# Patient Record
Sex: FEMALE | Race: BLACK OR AFRICAN AMERICAN | NOT HISPANIC OR LATINO | Employment: OTHER | URBAN - METROPOLITAN AREA
[De-identification: names, ages, dates, MRNs, and addresses within clinical notes are randomized per-mention and may not be internally consistent; named-entity substitution may affect disease eponyms.]

---

## 2023-03-31 RX ORDER — SPIRONOLACTONE 25 MG/1
12.5 TABLET ORAL DAILY
COMMUNITY
End: 2023-04-20 | Stop reason: SDUPTHER

## 2023-04-20 DIAGNOSIS — I10 HYPERTENSION, UNSPECIFIED TYPE: Primary | ICD-10-CM

## 2023-04-20 RX ORDER — LOSARTAN POTASSIUM 25 MG/1
1 TABLET ORAL DAILY
COMMUNITY
Start: 2022-08-09 | End: 2023-04-20 | Stop reason: SDUPTHER

## 2023-04-21 RX ORDER — LOSARTAN POTASSIUM 25 MG/1
25 TABLET ORAL DAILY
Qty: 90 TABLET | Refills: 3 | Status: SHIPPED | OUTPATIENT
Start: 2023-04-21 | End: 2024-06-05 | Stop reason: SDUPTHER

## 2023-04-21 RX ORDER — SPIRONOLACTONE 25 MG/1
12.5 TABLET ORAL DAILY
Qty: 90 TABLET | Refills: 3 | Status: SHIPPED | OUTPATIENT
Start: 2023-04-21 | End: 2024-05-23 | Stop reason: SDUPTHER

## 2023-05-01 ENCOUNTER — OFFICE VISIT (OUTPATIENT)
Dept: PRIMARY CARE | Facility: CLINIC | Age: 88
End: 2023-05-01
Payer: MEDICARE

## 2023-05-01 VITALS — WEIGHT: 150 LBS | DIASTOLIC BLOOD PRESSURE: 72 MMHG | SYSTOLIC BLOOD PRESSURE: 122 MMHG | BODY MASS INDEX: 28.34 KG/M2

## 2023-05-01 DIAGNOSIS — R26.2 DIFFICULTY WALKING: ICD-10-CM

## 2023-05-01 DIAGNOSIS — E78.5 HYPERLIPIDEMIA, UNSPECIFIED HYPERLIPIDEMIA TYPE: ICD-10-CM

## 2023-05-01 DIAGNOSIS — D64.9 ANEMIA, UNSPECIFIED TYPE: ICD-10-CM

## 2023-05-01 DIAGNOSIS — I10 HYPERTENSION, UNSPECIFIED TYPE: Primary | ICD-10-CM

## 2023-05-01 DIAGNOSIS — M19.90 ARTHRITIS: ICD-10-CM

## 2023-05-01 PROCEDURE — 3078F DIAST BP <80 MM HG: CPT | Performed by: INTERNAL MEDICINE

## 2023-05-01 PROCEDURE — 99214 OFFICE O/P EST MOD 30 MIN: CPT | Performed by: INTERNAL MEDICINE

## 2023-05-01 PROCEDURE — G0439 PPPS, SUBSEQ VISIT: HCPCS | Performed by: INTERNAL MEDICINE

## 2023-05-01 PROCEDURE — 1159F MED LIST DOCD IN RCRD: CPT | Performed by: INTERNAL MEDICINE

## 2023-05-01 PROCEDURE — 1036F TOBACCO NON-USER: CPT | Performed by: INTERNAL MEDICINE

## 2023-05-01 PROCEDURE — 1170F FXNL STATUS ASSESSED: CPT | Performed by: INTERNAL MEDICINE

## 2023-05-01 PROCEDURE — 3074F SYST BP LT 130 MM HG: CPT | Performed by: INTERNAL MEDICINE

## 2023-05-01 PROCEDURE — 1160F RVW MEDS BY RX/DR IN RCRD: CPT | Performed by: INTERNAL MEDICINE

## 2023-05-01 RX ORDER — FUROSEMIDE 20 MG/1
TABLET ORAL
COMMUNITY
End: 2023-11-16 | Stop reason: SDUPTHER

## 2023-05-01 RX ORDER — AMLODIPINE BESYLATE 5 MG/1
1 TABLET ORAL DAILY
COMMUNITY
End: 2023-08-01 | Stop reason: DRUGHIGH

## 2023-05-01 RX ORDER — MULTIVITAMIN
1 TABLET ORAL DAILY
COMMUNITY

## 2023-05-01 RX ORDER — HYDROXYCHLOROQUINE SULFATE 200 MG/1
1 TABLET, FILM COATED ORAL DAILY
COMMUNITY
Start: 2014-11-10 | End: 2023-08-01 | Stop reason: SDUPTHER

## 2023-05-01 RX ORDER — MIRTAZAPINE 7.5 MG/1
7.5 TABLET, FILM COATED ORAL NIGHTLY
COMMUNITY
End: 2023-10-27 | Stop reason: SDUPTHER

## 2023-05-01 RX ORDER — METOLAZONE 5 MG/1
5 TABLET ORAL
COMMUNITY
Start: 2013-04-16 | End: 2023-08-01

## 2023-05-01 RX ORDER — CELECOXIB 200 MG/1
200 CAPSULE ORAL 2 TIMES DAILY
COMMUNITY
Start: 2012-06-23 | End: 2023-08-01

## 2023-05-01 RX ORDER — FERROUS SULFATE 325(65) MG
325 TABLET ORAL
COMMUNITY
End: 2023-08-01

## 2023-05-01 RX ORDER — CYANOCOBALAMIN 1000 UG/ML
1000 INJECTION, SOLUTION INTRAMUSCULAR; SUBCUTANEOUS
COMMUNITY
Start: 2016-07-11

## 2023-05-01 RX ORDER — LEVOTHYROXINE SODIUM 25 UG/1
TABLET ORAL
COMMUNITY
End: 2023-08-28 | Stop reason: SDUPTHER

## 2023-05-01 RX ORDER — METOLAZONE 5 MG/1
1 TABLET ORAL DAILY
COMMUNITY
End: 2023-08-01

## 2023-05-01 RX ORDER — POTASSIUM CHLORIDE 1500 MG/1
1 TABLET, EXTENDED RELEASE ORAL 2 TIMES DAILY
COMMUNITY
Start: 2020-06-04 | End: 2023-08-01 | Stop reason: SDUPTHER

## 2023-05-01 RX ORDER — PREDNISONE 5 MG/1
TABLET ORAL
COMMUNITY
End: 2023-08-01 | Stop reason: SDUPTHER

## 2023-05-01 RX ORDER — PANTOPRAZOLE SODIUM 40 MG/1
1 TABLET, DELAYED RELEASE ORAL DAILY
COMMUNITY
End: 2023-11-03 | Stop reason: WASHOUT

## 2023-05-01 RX ORDER — MULTIVITAMIN WITH IRON
1 TABLET ORAL
COMMUNITY
Start: 2011-07-13

## 2023-05-01 RX ORDER — AMLODIPINE BESYLATE 5 MG/1
5 TABLET ORAL
COMMUNITY
Start: 2011-07-16 | End: 2023-08-01 | Stop reason: ALTCHOICE

## 2023-05-01 RX ORDER — HYDROCHLOROTHIAZIDE 25 MG/1
25 TABLET ORAL
COMMUNITY
Start: 2012-10-27 | End: 2023-08-01 | Stop reason: SDUPTHER

## 2023-05-01 RX ORDER — TRAMADOL HYDROCHLORIDE 50 MG/1
1 TABLET ORAL 2 TIMES DAILY PRN
COMMUNITY
Start: 2014-08-12 | End: 2023-08-01

## 2023-05-01 RX ORDER — TRIAMCINOLONE ACETONIDE 55 UG/1
2 SPRAY, METERED NASAL
COMMUNITY
Start: 2012-04-14 | End: 2023-08-01

## 2023-05-01 RX ORDER — POTASSIUM CHLORIDE 1500 MG/1
TABLET, EXTENDED RELEASE ORAL 2 TIMES DAILY
COMMUNITY
End: 2024-05-08 | Stop reason: SDUPTHER

## 2023-05-01 RX ORDER — HYDROCHLOROTHIAZIDE 25 MG/1
1 TABLET ORAL DAILY
COMMUNITY
End: 2023-08-01 | Stop reason: SDUPTHER

## 2023-05-01 ASSESSMENT — ACTIVITIES OF DAILY LIVING (ADL)
GROCERY_SHOPPING: NEEDS ASSISTANCE
MANAGING_FINANCES: NEEDS ASSISTANCE
BATHING: NEEDS ASSISTANCE
DOING_HOUSEWORK: NEEDS ASSISTANCE
TAKING_MEDICATION: NEEDS ASSISTANCE
DRESSING: NEEDS ASSISTANCE

## 2023-05-01 ASSESSMENT — PAIN SCALES - GENERAL: PAINLEVEL: 2

## 2023-05-01 ASSESSMENT — ENCOUNTER SYMPTOMS
OCCASIONAL FEELINGS OF UNSTEADINESS: 1
DEPRESSION: 0
LOSS OF SENSATION IN FEET: 0

## 2023-05-01 NOTE — PROGRESS NOTES
Subjective   Reason for Visit: Rita Maldonado is an 89 y.o. female here for a Medicare Wellness visit.     Past Medical, Surgical, and Family History reviewed and updated in chart.    Reviewed all medications by prescribing practitioner or clinical pharmacist (such as prescriptions, OTCs, herbal therapies and supplements) and documented in the medical record.    HPI  89 years old female comes in accompanied by her daughter for a subsequent Medicare wellness exam and follow-up visit.  She suffers from hypertension and takes losartan 25 mg a day spironolactone 25 mg half a tablet a day.  She complains of severe osteoarthritis in her hands and all over her joints.  She takes no medication for that.  Lives at home with her son who takes care of her.  No known allergies.  Does not smoke does not drink.  Lab was done recently and February 10, 2020 x-ray and we agreed to repeat her lab next visit in around July or August.  Patient Care Team:  Pedro Diallo MD as PCP - General (Internal Medicine)     Review of Systems  Severe osteoarthritis in her fingers and her hands and most of her joint.  Otherwise she is feeling well.  Cognitively she is awake and alert.  She remembers things she has good cognitive status.  Uses a walker for ambulation.  Had no falls recently.  He eats well and sleeps well.  Objective   Vitals:  /72 (BP Location: Left arm, Patient Position: Sitting)   Wt 68 kg (150 lb)   BMI 28.34 kg/m²       Physical Exam  Alert oriented in no distress.  Nonicteric sclera or jaundice.  Face symmetrical cranial nerves intact.  Lungs clear no rales wheezing or crackles but diminished.  Neck supple no masses no bruits no thyromegaly or jugular venous distention.  Heart normal S1 and S2 regular rhythm.  Abdomen benign nontender no masses no organomegaly.  Neurological exam intact 5 clicks.  I am sorry what  Assessment/Plan   Problem List Items Addressed This Visit    None  Visit Diagnoses       Hypertension,  unspecified type    -  Primary    Hyperlipidemia, unspecified hyperlipidemia type        Anemia, unspecified type        Arthritis        Relevant Orders    Disability Placard    Difficulty walking        Relevant Orders    Disability Placard

## 2023-08-01 ENCOUNTER — OFFICE VISIT (OUTPATIENT)
Dept: PRIMARY CARE | Facility: CLINIC | Age: 88
End: 2023-08-01
Payer: MEDICARE

## 2023-08-01 VITALS
WEIGHT: 153 LBS | TEMPERATURE: 97.1 F | SYSTOLIC BLOOD PRESSURE: 112 MMHG | DIASTOLIC BLOOD PRESSURE: 70 MMHG | BODY MASS INDEX: 28.91 KG/M2

## 2023-08-01 DIAGNOSIS — M06.9 RHEUMATOID ARTHRITIS INVOLVING MULTIPLE SITES, UNSPECIFIED WHETHER RHEUMATOID FACTOR PRESENT (MULTI): ICD-10-CM

## 2023-08-01 DIAGNOSIS — M15.9 PRIMARY OSTEOARTHRITIS INVOLVING MULTIPLE JOINTS: ICD-10-CM

## 2023-08-01 DIAGNOSIS — I10 HYPERTENSION, UNSPECIFIED TYPE: Primary | ICD-10-CM

## 2023-08-01 DIAGNOSIS — E78.5 HYPERLIPIDEMIA, UNSPECIFIED HYPERLIPIDEMIA TYPE: ICD-10-CM

## 2023-08-01 PROCEDURE — 3074F SYST BP LT 130 MM HG: CPT | Performed by: INTERNAL MEDICINE

## 2023-08-01 PROCEDURE — 1036F TOBACCO NON-USER: CPT | Performed by: INTERNAL MEDICINE

## 2023-08-01 PROCEDURE — 85025 COMPLETE CBC W/AUTO DIFF WBC: CPT | Performed by: INTERNAL MEDICINE

## 2023-08-01 PROCEDURE — 1159F MED LIST DOCD IN RCRD: CPT | Performed by: INTERNAL MEDICINE

## 2023-08-01 PROCEDURE — 99214 OFFICE O/P EST MOD 30 MIN: CPT | Performed by: INTERNAL MEDICINE

## 2023-08-01 PROCEDURE — 1160F RVW MEDS BY RX/DR IN RCRD: CPT | Performed by: INTERNAL MEDICINE

## 2023-08-01 PROCEDURE — 80061 LIPID PANEL: CPT | Performed by: INTERNAL MEDICINE

## 2023-08-01 PROCEDURE — 1125F AMNT PAIN NOTED PAIN PRSNT: CPT | Performed by: INTERNAL MEDICINE

## 2023-08-01 PROCEDURE — 80053 COMPREHEN METABOLIC PANEL: CPT | Performed by: INTERNAL MEDICINE

## 2023-08-01 PROCEDURE — 3078F DIAST BP <80 MM HG: CPT | Performed by: INTERNAL MEDICINE

## 2023-08-01 RX ORDER — PREDNISONE 5 MG/1
5 TABLET ORAL DAILY
Qty: 30 TABLET | Refills: 1 | Status: SHIPPED | OUTPATIENT
Start: 2023-08-01 | End: 2023-11-03 | Stop reason: WASHOUT

## 2023-08-01 RX ORDER — HYDROXYCHLOROQUINE SULFATE 200 MG/1
200 TABLET, FILM COATED ORAL DAILY
Qty: 90 TABLET | Refills: 1 | Status: SHIPPED | OUTPATIENT
Start: 2023-08-01 | End: 2023-11-16 | Stop reason: SDUPTHER

## 2023-08-01 ASSESSMENT — ENCOUNTER SYMPTOMS
DEPRESSION: 0
LOSS OF SENSATION IN FEET: 0
OCCASIONAL FEELINGS OF UNSTEADINESS: 1

## 2023-08-01 ASSESSMENT — PAIN SCALES - GENERAL: PAINLEVEL: 6

## 2023-08-01 NOTE — PROGRESS NOTES
Subjective   Patient ID: Rita Maldonado is a 89 y.o. female who presents for Hypertension, Hypothyroidism, Hyperlipidemia, and Osteoarthritis.    HPI   89 years old female comes in to see me in the wheelchair and pushed by her daughter.  She is living at home and her son is taking care of her and all of her nutrition.  She is feeling very well except for arthritic pain.  This is all over her body.  She has rheumatoid arthritis for which she takes hydroxychloroquine 200 mg once a day.  We agreed to add prednisone 5 mg a day.  No other symptoms.  Review of system is negative.  Review of Systems    Objective   /70 (BP Location: Left arm, Patient Position: Sitting, BP Cuff Size: Adult)   Temp 36.2 °C (97.1 °F) (Temporal)   Wt 69.4 kg (153 lb)   BMI 28.91 kg/m²     Physical Exam  Alert and oriented in no acute distress pleasant cooperative.  Nonicteric sclera or jaundice.  Face symmetrical cranial nerves intact.  Neck supple no masses no lymph no thyromegaly or jugular venous distention.  Lungs clear diminished but no rales or wheezing.  Heart normal S1 and S2 regular rhythm systolic ejection murmur 2/6 present.  Abdomen benign neurologically intact  To add prednisone 5 mg a day to take with your food on daily basis same time every day.  Let me know in 30 days how things go home.  I will see you in 3 months otherwise.  Assessment/Plan   Diagnoses and all orders for this visit:  Hypertension, unspecified type  -     Comprehensive Metabolic Panel  -     CBC  Hyperlipidemia, unspecified hyperlipidemia type  -     Lipid Panel  Primary osteoarthritis involving multiple joints  -     predniSONE (Deltasone) 5 mg tablet; Take 1 tablet (5 mg) by mouth once daily.  Rheumatoid arthritis involving multiple sites, unspecified whether rheumatoid factor present (CMS/[facility])  -     hydroxychloroquine (Plaquenil) 200 mg tablet; Take 1 tablet (200 mg) by mouth once daily.

## 2023-08-04 ENCOUNTER — TELEPHONE (OUTPATIENT)
Dept: PRIMARY CARE | Facility: CLINIC | Age: 88
End: 2023-08-04
Payer: MEDICARE

## 2023-08-04 NOTE — TELEPHONE ENCOUNTER
I called the patient with lab results and discussed them with her daughter.  Informed her about sugar 1083 the kidney function GFR is 31.51.  Liver function is good lipid panel is good.  Make sure she drinks enough water every day

## 2023-08-10 ENCOUNTER — TELEPHONE (OUTPATIENT)
Dept: PRIMARY CARE | Facility: CLINIC | Age: 88
End: 2023-08-10
Payer: MEDICARE

## 2023-08-10 DIAGNOSIS — G47.00 INSOMNIA, UNSPECIFIED TYPE: ICD-10-CM

## 2023-08-11 RX ORDER — MIRTAZAPINE 7.5 MG/1
7.5 TABLET, FILM COATED ORAL NIGHTLY
Qty: 90 TABLET | Refills: 0 | Status: SHIPPED | OUTPATIENT
Start: 2023-08-11 | End: 2023-11-03 | Stop reason: SDUPTHER

## 2023-08-28 DIAGNOSIS — R53.83 FATIGUE, UNSPECIFIED TYPE: Primary | ICD-10-CM

## 2023-08-28 RX ORDER — LEVOTHYROXINE SODIUM 25 UG/1
25 TABLET ORAL
Qty: 90 TABLET | Refills: 2 | Status: SHIPPED | OUTPATIENT
Start: 2023-08-28 | End: 2024-05-23 | Stop reason: SDUPTHER

## 2023-10-13 DIAGNOSIS — M15.9 PRIMARY OSTEOARTHRITIS INVOLVING MULTIPLE JOINTS: ICD-10-CM

## 2023-10-27 DIAGNOSIS — F32.A DEPRESSION, UNSPECIFIED DEPRESSION TYPE: Primary | ICD-10-CM

## 2023-10-27 RX ORDER — MIRTAZAPINE 7.5 MG/1
7.5 TABLET, FILM COATED ORAL NIGHTLY
Qty: 90 TABLET | Refills: 3 | Status: SHIPPED | OUTPATIENT
Start: 2023-10-27

## 2023-11-03 ENCOUNTER — OFFICE VISIT (OUTPATIENT)
Dept: PRIMARY CARE | Facility: CLINIC | Age: 88
End: 2023-11-03
Payer: MEDICARE

## 2023-11-03 VITALS
WEIGHT: 154 LBS | BODY MASS INDEX: 29.1 KG/M2 | DIASTOLIC BLOOD PRESSURE: 79 MMHG | SYSTOLIC BLOOD PRESSURE: 162 MMHG | TEMPERATURE: 97.3 F | HEART RATE: 85 BPM | OXYGEN SATURATION: 97 %

## 2023-11-03 DIAGNOSIS — M15.9 PRIMARY OSTEOARTHRITIS INVOLVING MULTIPLE JOINTS: ICD-10-CM

## 2023-11-03 DIAGNOSIS — E78.5 HYPERLIPIDEMIA, UNSPECIFIED HYPERLIPIDEMIA TYPE: ICD-10-CM

## 2023-11-03 DIAGNOSIS — I10 HYPERTENSION, UNSPECIFIED TYPE: ICD-10-CM

## 2023-11-03 DIAGNOSIS — Z23 FLU VACCINE NEED: Primary | ICD-10-CM

## 2023-11-03 DIAGNOSIS — G47.00 INSOMNIA, UNSPECIFIED TYPE: ICD-10-CM

## 2023-11-03 DIAGNOSIS — M06.9 RHEUMATOID ARTHRITIS INVOLVING MULTIPLE SITES, UNSPECIFIED WHETHER RHEUMATOID FACTOR PRESENT (MULTI): ICD-10-CM

## 2023-11-03 PROCEDURE — 1159F MED LIST DOCD IN RCRD: CPT | Performed by: INTERNAL MEDICINE

## 2023-11-03 PROCEDURE — 3078F DIAST BP <80 MM HG: CPT | Performed by: INTERNAL MEDICINE

## 2023-11-03 PROCEDURE — 99213 OFFICE O/P EST LOW 20 MIN: CPT | Performed by: INTERNAL MEDICINE

## 2023-11-03 PROCEDURE — 1160F RVW MEDS BY RX/DR IN RCRD: CPT | Performed by: INTERNAL MEDICINE

## 2023-11-03 PROCEDURE — 1036F TOBACCO NON-USER: CPT | Performed by: INTERNAL MEDICINE

## 2023-11-03 PROCEDURE — 1126F AMNT PAIN NOTED NONE PRSNT: CPT | Performed by: INTERNAL MEDICINE

## 2023-11-03 PROCEDURE — 3077F SYST BP >= 140 MM HG: CPT | Performed by: INTERNAL MEDICINE

## 2023-11-03 ASSESSMENT — PAIN SCALES - GENERAL: PAINLEVEL: 0-NO PAIN

## 2023-11-03 NOTE — PROGRESS NOTES
Subjective   Patient ID: Rita Maldonado is a 89 y.o. female who presents for Follow-up.    HPI   89 years old female comes in to see me accompanied by her daughter for regular checkup.  She needs a flu shot but unfortunately we ran out of the high-dose so she decided to go to the pharmacy to get it.  I offered her the flu shot high-dose by next week.  Otherwise she is feeling well and has no specific complaint or symptoms.  Her medications reviewed and reconciled.  She had exactly what I had on my list.  Review of Systems  12 system reviewed 12 system negative.  She denies any chest pain shortness of breath insomnia bad or poor appetite no GI no  or neurological symptoms.  Objective   /79 (BP Location: Left arm, Patient Position: Sitting, BP Cuff Size: Adult)   Pulse 85   Temp 36.3 °C (97.3 °F) (Temporal)   Wt 69.9 kg (154 lb)   SpO2 97%   BMI 29.10 kg/m²     Physical Exam  Alert oriented in no distress very pleasant cooperative sweet lady.  Nonicteric sclera no jaundice.  Face symmetrical cranial nerves intact.  Neck supple no masses no lymph node thyromegaly or jugular venous distention.  Lungs clear diminished but no rales no wheezing.  Heart normal S1 and S2 regular rhythm.  Abdomen benign nontender no masses no organomegaly.  No pain on palpations.  Neurological exam intact.  Assessment/Plan   Diagnoses and all orders for this visit:  Flu vaccine need  -     Flu vaccine, quadrivalent, high-dose, preservative free, age 65y+ (FLUZONE)  Insomnia, unspecified type  Hypertension, unspecified type  Hyperlipidemia, unspecified hyperlipidemia type  Primary osteoarthritis involving multiple joints  Rheumatoid arthritis involving multiple sites, unspecified whether rheumatoid factor present (CMS/[facility])

## 2023-11-09 RX ORDER — PREDNISONE 5 MG/1
5 TABLET ORAL DAILY
Qty: 30 TABLET | Refills: 1 | Status: SHIPPED | OUTPATIENT
Start: 2023-11-09 | End: 2024-02-02 | Stop reason: SDUPTHER

## 2023-11-16 ENCOUNTER — TELEPHONE (OUTPATIENT)
Dept: PRIMARY CARE | Facility: CLINIC | Age: 88
End: 2023-11-16
Payer: MEDICARE

## 2023-11-16 DIAGNOSIS — I10 HYPERTENSION, UNSPECIFIED TYPE: Primary | ICD-10-CM

## 2023-11-16 DIAGNOSIS — M06.9 RHEUMATOID ARTHRITIS INVOLVING MULTIPLE SITES, UNSPECIFIED WHETHER RHEUMATOID FACTOR PRESENT (MULTI): ICD-10-CM

## 2023-11-16 RX ORDER — HYDROXYCHLOROQUINE SULFATE 200 MG/1
200 TABLET, FILM COATED ORAL DAILY
Qty: 90 TABLET | Refills: 1 | Status: SHIPPED | OUTPATIENT
Start: 2023-11-16

## 2023-11-16 RX ORDER — FUROSEMIDE 20 MG/1
TABLET ORAL
Qty: 90 TABLET | Refills: 3 | Status: SHIPPED | OUTPATIENT
Start: 2023-11-16

## 2023-11-16 RX ORDER — HYDROXYCHLOROQUINE SULFATE 200 MG/1
200 TABLET, FILM COATED ORAL DAILY
Qty: 90 TABLET | Refills: 0 | Status: SHIPPED | OUTPATIENT
Start: 2023-11-16 | End: 2024-02-14

## 2023-11-16 RX ORDER — FUROSEMIDE 20 MG/1
20 TABLET ORAL DAILY
Qty: 90 TABLET | Refills: 3 | Status: SHIPPED | OUTPATIENT
Start: 2023-11-16 | End: 2024-11-15

## 2024-02-02 ENCOUNTER — OFFICE VISIT (OUTPATIENT)
Dept: PRIMARY CARE | Facility: CLINIC | Age: 89
End: 2024-02-02
Payer: MEDICARE

## 2024-02-02 VITALS
DIASTOLIC BLOOD PRESSURE: 76 MMHG | OXYGEN SATURATION: 98 % | TEMPERATURE: 97.3 F | BODY MASS INDEX: 27.96 KG/M2 | SYSTOLIC BLOOD PRESSURE: 130 MMHG | WEIGHT: 148 LBS | HEART RATE: 78 BPM

## 2024-02-02 DIAGNOSIS — R53.83 FATIGUE, UNSPECIFIED TYPE: Primary | ICD-10-CM

## 2024-02-02 DIAGNOSIS — M19.90 ARTHRITIS: ICD-10-CM

## 2024-02-02 DIAGNOSIS — M06.9 RHEUMATOID ARTHRITIS INVOLVING MULTIPLE SITES, UNSPECIFIED WHETHER RHEUMATOID FACTOR PRESENT (MULTI): ICD-10-CM

## 2024-02-02 DIAGNOSIS — I10 HYPERTENSION, UNSPECIFIED TYPE: ICD-10-CM

## 2024-02-02 DIAGNOSIS — M15.9 PRIMARY OSTEOARTHRITIS INVOLVING MULTIPLE JOINTS: ICD-10-CM

## 2024-02-02 PROCEDURE — 3075F SYST BP GE 130 - 139MM HG: CPT | Performed by: INTERNAL MEDICINE

## 2024-02-02 PROCEDURE — 84443 ASSAY THYROID STIM HORMONE: CPT | Performed by: INTERNAL MEDICINE

## 2024-02-02 PROCEDURE — 80048 BASIC METABOLIC PNL TOTAL CA: CPT | Performed by: INTERNAL MEDICINE

## 2024-02-02 PROCEDURE — 1157F ADVNC CARE PLAN IN RCRD: CPT | Performed by: INTERNAL MEDICINE

## 2024-02-02 PROCEDURE — 1036F TOBACCO NON-USER: CPT | Performed by: INTERNAL MEDICINE

## 2024-02-02 PROCEDURE — 1159F MED LIST DOCD IN RCRD: CPT | Performed by: INTERNAL MEDICINE

## 2024-02-02 PROCEDURE — 3078F DIAST BP <80 MM HG: CPT | Performed by: INTERNAL MEDICINE

## 2024-02-02 PROCEDURE — 1126F AMNT PAIN NOTED NONE PRSNT: CPT | Performed by: INTERNAL MEDICINE

## 2024-02-02 PROCEDURE — 1160F RVW MEDS BY RX/DR IN RCRD: CPT | Performed by: INTERNAL MEDICINE

## 2024-02-02 PROCEDURE — 99213 OFFICE O/P EST LOW 20 MIN: CPT | Performed by: INTERNAL MEDICINE

## 2024-02-02 RX ORDER — PREDNISONE 5 MG/1
5 TABLET ORAL DAILY
Qty: 30 TABLET | Refills: 0 | Status: SHIPPED | OUTPATIENT
Start: 2024-02-02 | End: 2024-05-13 | Stop reason: SDUPTHER

## 2024-02-02 ASSESSMENT — PATIENT HEALTH QUESTIONNAIRE - PHQ9
SUM OF ALL RESPONSES TO PHQ9 QUESTIONS 1 AND 2: 0
1. LITTLE INTEREST OR PLEASURE IN DOING THINGS: NOT AT ALL
2. FEELING DOWN, DEPRESSED OR HOPELESS: NOT AT ALL

## 2024-02-02 ASSESSMENT — ENCOUNTER SYMPTOMS
OCCASIONAL FEELINGS OF UNSTEADINESS: 0
LOSS OF SENSATION IN FEET: 0
DEPRESSION: 0

## 2024-02-02 NOTE — PROGRESS NOTES
Subjective   Patient ID: Rita Maldonado is a 89 y.o. female who presents for Follow-up.    HPI   89 years old female comes in, accompanied by her daughter to check on her medication and her condition.  She receives mirtazapine 7.5 a day, Lasix 20 mg a day, levothyroxine 1.0 95 mcg, losartan 25 mg and spironolactone 25 mg a day.  Also takes prednisone 5 mg a day for her severe osteoarthritis.  She wonders if she needs potassium supplement and the answer is yes since you taking Lasix take supplement of KCl 10 mEq every day.  Will refill prednisone for your arthritis.  Until you see rheumatology Dr. Koch  Review of Systems  12 system review 12 system negative except for severe osteoarthritis in the lower extremities in her knees and in her fingers of the hand.  Objective   /76 (BP Location: Left arm, Patient Position: Sitting, BP Cuff Size: Adult)   Pulse 78   Temp 36.3 °C (97.3 °F) (Temporal)   Wt 67.1 kg (148 lb)   SpO2 98%   BMI 27.96 kg/m²     Physical Exam  Alert oriented in no distress pleasant cooperative.  Nonicteric sclera no jaundice.  Face symmetrical cranial nerves intact.  Neck supple no masses no lymph node no thyromegaly or jugular venous distention.  Lungs clear no rales wheezing or crackles.  Heart normal S1 and S2 regular rhythm.  Abdomen benign neurologically intact.  Deformities from osteoarthritis visible in her hands and fingers.  Knee pain present also.  We agreed to restart her on prednisone 5 mg a day.  Assessment/Plan   I am wu [place] Diagnoses and all orders for this visit:  Fatigue, unspecified type  -     Thyroid Stimulating Hormone  Hypertension, unspecified type  -     Basic Metabolic Panel  Arthritis  Rheumatoid arthritis involving multiple sites, unspecified whether rheumatoid factor present (CMS/[facility])  Primary osteoarthritis involving multiple joints

## 2024-02-07 ENCOUNTER — TELEPHONE (OUTPATIENT)
Dept: PRIMARY CARE | Facility: CLINIC | Age: 89
End: 2024-02-07
Payer: MEDICARE

## 2024-02-07 NOTE — TELEPHONE ENCOUNTER
----- Message from Pedro Diallo MD sent at 2/7/2024 12:53 PM EST -----  Regarding: r  At  to next visit please bring in a bag all your home medication for revision.  And know that your thyroid test is normal.  Increase your water intake daily.  We will have to do some changes in your medications so it does not affect your declining kidney function

## 2024-03-29 ENCOUNTER — OFFICE VISIT (OUTPATIENT)
Dept: RHEUMATOLOGY | Facility: CLINIC | Age: 89
End: 2024-03-29
Payer: MEDICARE

## 2024-03-29 VITALS
TEMPERATURE: 97.9 F | SYSTOLIC BLOOD PRESSURE: 134 MMHG | HEART RATE: 84 BPM | DIASTOLIC BLOOD PRESSURE: 64 MMHG | HEIGHT: 61 IN | WEIGHT: 152.8 LBS | BODY MASS INDEX: 28.85 KG/M2

## 2024-03-29 DIAGNOSIS — N28.9 RENAL INSUFFICIENCY: Primary | ICD-10-CM

## 2024-03-29 PROCEDURE — 1125F AMNT PAIN NOTED PAIN PRSNT: CPT | Performed by: INTERNAL MEDICINE

## 2024-03-29 PROCEDURE — 1159F MED LIST DOCD IN RCRD: CPT | Performed by: INTERNAL MEDICINE

## 2024-03-29 PROCEDURE — 1160F RVW MEDS BY RX/DR IN RCRD: CPT | Performed by: INTERNAL MEDICINE

## 2024-03-29 PROCEDURE — 99213 OFFICE O/P EST LOW 20 MIN: CPT | Performed by: INTERNAL MEDICINE

## 2024-03-29 PROCEDURE — 1157F ADVNC CARE PLAN IN RCRD: CPT | Performed by: INTERNAL MEDICINE

## 2024-03-29 ASSESSMENT — PAIN SCALES - GENERAL: PAINLEVEL: 4

## 2024-03-29 NOTE — PROGRESS NOTES
She presents for follow-up evaluation with complaints of pain involving her knees and shins.  She also has discomfort in her fingers.  She has continued to take hydroxychloroquine 200 mg daily, prednisone 5 mg daily.  She is able to ambulate short distances using a walker or cane.  She otherwise has been fairly sedentary at home.    Examination is remarkable for bony deformity of the DIP and PIP joints of the digits of both hands.  There is soft tissue thickening as well as squaring of the first CMC joint on the right more so than the left.  The elbows and shoulders are not tender.  There is bony prominence of the knees with soft tissue thickening of the right knee more so than the left knee.  There is trace pitting edema in the distal aspect of the right lower extremity and 1+ pitting edema at the left ankle.  She is alert.    Laboratory (2/2/2024) BUN 60, creatinine 3.1, glucose 104, TSH 2.676, sodium 145, potassium 4.9, chloride 109, bicarbonate 23.    She has rheumatoid arthritis with joint deformities particular involving her hands and knees.  She is advanced renal insufficiency, history of hiatus hernia, pulmonary hypertension by 2D echocardiogram, vitamin B12 deficiency, hypertension, hypothyroidism, status post left hip arthroplasty.  She has osteopenia and not on any antiresorptive therapy.    She is to continue with her current medications.  She is to take acetaminophen 325 mg 3 times per day as needed for pain.  She is to do range of motion and muscle strengthening exercises to the lower extremities when she is sitting in the chair.  She is referred to nephrology regarding renal insufficiency.  She is to return at next available office appointment.

## 2024-05-08 DIAGNOSIS — E87.6 HYPOKALEMIA: Primary | ICD-10-CM

## 2024-05-08 RX ORDER — POTASSIUM CHLORIDE 20 MEQ/1
20 TABLET, EXTENDED RELEASE ORAL DAILY
Qty: 90 TABLET | Refills: 3 | Status: SHIPPED | OUTPATIENT
Start: 2024-05-08 | End: 2024-05-13

## 2024-05-13 ENCOUNTER — TELEPHONE (OUTPATIENT)
Dept: PRIMARY CARE | Facility: CLINIC | Age: 89
End: 2024-05-13

## 2024-05-13 ENCOUNTER — OFFICE VISIT (OUTPATIENT)
Dept: NEPHROLOGY | Facility: CLINIC | Age: 89
End: 2024-05-13
Payer: MEDICARE

## 2024-05-13 ENCOUNTER — LAB (OUTPATIENT)
Dept: LAB | Facility: LAB | Age: 89
End: 2024-05-13
Payer: MEDICARE

## 2024-05-13 VITALS
DIASTOLIC BLOOD PRESSURE: 65 MMHG | SYSTOLIC BLOOD PRESSURE: 134 MMHG | HEART RATE: 101 BPM | BODY MASS INDEX: 29.07 KG/M2 | WEIGHT: 154 LBS | HEIGHT: 61 IN

## 2024-05-13 DIAGNOSIS — I10 PRIMARY HYPERTENSION: ICD-10-CM

## 2024-05-13 DIAGNOSIS — M15.9 PRIMARY OSTEOARTHRITIS INVOLVING MULTIPLE JOINTS: ICD-10-CM

## 2024-05-13 DIAGNOSIS — N18.32 STAGE 3B CHRONIC KIDNEY DISEASE (MULTI): ICD-10-CM

## 2024-05-13 DIAGNOSIS — N18.32 STAGE 3B CHRONIC KIDNEY DISEASE (MULTI): Primary | ICD-10-CM

## 2024-05-13 PROBLEM — M05.20 RHEUMATOID ARTERITIS (MULTI): Status: ACTIVE | Noted: 2024-05-13

## 2024-05-13 PROBLEM — N18.30 CHRONIC KIDNEY DISEASE (CKD), STAGE III (MODERATE) (MULTI): Status: ACTIVE | Noted: 2024-05-13

## 2024-05-13 LAB
ALBUMIN SERPL BCP-MCNC: 3.9 G/DL (ref 3.4–5)
ANION GAP SERPL CALC-SCNC: 15 MMOL/L (ref 10–20)
BUN SERPL-MCNC: 40 MG/DL (ref 6–23)
CALCIUM SERPL-MCNC: 9.5 MG/DL (ref 8.6–10.6)
CHLORIDE SERPL-SCNC: 108 MMOL/L (ref 98–107)
CO2 SERPL-SCNC: 25 MMOL/L (ref 21–32)
CREAT SERPL-MCNC: 1.99 MG/DL (ref 0.5–1.05)
EGFRCR SERPLBLD CKD-EPI 2021: 23 ML/MIN/1.73M*2
GLUCOSE SERPL-MCNC: 76 MG/DL (ref 74–99)
PHOSPHATE SERPL-MCNC: 3.1 MG/DL (ref 2.5–4.9)
POTASSIUM SERPL-SCNC: 4.2 MMOL/L (ref 3.5–5.3)
SODIUM SERPL-SCNC: 144 MMOL/L (ref 136–145)

## 2024-05-13 PROCEDURE — 1157F ADVNC CARE PLAN IN RCRD: CPT | Performed by: INTERNAL MEDICINE

## 2024-05-13 PROCEDURE — 80069 RENAL FUNCTION PANEL: CPT

## 2024-05-13 PROCEDURE — 36415 COLL VENOUS BLD VENIPUNCTURE: CPT

## 2024-05-13 PROCEDURE — 3078F DIAST BP <80 MM HG: CPT | Performed by: INTERNAL MEDICINE

## 2024-05-13 PROCEDURE — 1160F RVW MEDS BY RX/DR IN RCRD: CPT | Performed by: INTERNAL MEDICINE

## 2024-05-13 PROCEDURE — 1036F TOBACCO NON-USER: CPT | Performed by: INTERNAL MEDICINE

## 2024-05-13 PROCEDURE — 1159F MED LIST DOCD IN RCRD: CPT | Performed by: INTERNAL MEDICINE

## 2024-05-13 PROCEDURE — 3075F SYST BP GE 130 - 139MM HG: CPT | Performed by: INTERNAL MEDICINE

## 2024-05-13 PROCEDURE — 1126F AMNT PAIN NOTED NONE PRSNT: CPT | Performed by: INTERNAL MEDICINE

## 2024-05-13 PROCEDURE — 99204 OFFICE O/P NEW MOD 45 MIN: CPT | Performed by: INTERNAL MEDICINE

## 2024-05-13 RX ORDER — PREDNISONE 5 MG/1
5 TABLET ORAL DAILY
Qty: 30 TABLET | Refills: 0 | Status: SHIPPED | OUTPATIENT
Start: 2024-05-13 | End: 2024-06-05 | Stop reason: SDUPTHER

## 2024-05-13 ASSESSMENT — ENCOUNTER SYMPTOMS
LIGHT-HEADEDNESS: 0
ARTHRALGIAS: 1
DIARRHEA: 0
NAUSEA: 0
CONSTIPATION: 0
CONFUSION: 0
APPETITE CHANGE: 0
DIZZINESS: 0
ABDOMINAL PAIN: 0
VOMITING: 0
SHORTNESS OF BREATH: 1
UNEXPECTED WEIGHT CHANGE: 0
DIFFICULTY URINATING: 0

## 2024-05-13 ASSESSMENT — PAIN SCALES - GENERAL: PAINLEVEL: 0-NO PAIN

## 2024-05-13 NOTE — PROGRESS NOTES
[facility] Progress Note    Subjective   Today we had the pleasure of seeing Rita Maldonado in the [facility] on May 13, 2024. She is a 89 y.o. female who presents as a new patient for renal insufficiency. She was referred by her Rheumatologist.     PMH: HTN, Rheumatoid arthritis  Surg Hx: hip fracture  FH: no hx of renal disease  Soc Hx: lives with some, needs assistance with ambulation    Previous Labs  - Feb 2024 - Cr 3.1, GFR 13  - Aug 2023 - Cr 1.5, GFR 31  - no recent urinary studies  - no recent renal imaging: CT abd/pelvis in 2019 showed mild right hydronephrosis    Notable meds: lasix, losartan, spironolactone, prednisone, hydroxychloroquine; potassium replacement      Today's visit:  Patient accompanied by her daughter. She has no acute complaints. No prior kidney problems. BP well controlled at home. Doesn't ambulate much. Has issues with leg swelling so on diuretics. Also taking K replacement. HTN for many years. Does not use NSAIDs. No issues with urination or obstruction. Has not been hospitalized in recent months.         Review of Systems   Constitutional:  Negative for appetite change and unexpected weight change.   Respiratory:  Positive for shortness of breath (with exertion).    Cardiovascular:  Positive for leg swelling. Negative for chest pain.   Gastrointestinal:  Negative for abdominal pain, constipation, diarrhea, nausea and vomiting.   Genitourinary:  Negative for difficulty urinating.   Musculoskeletal:  Positive for arthralgias.   Neurological:  Negative for dizziness and light-headedness.   Psychiatric/Behavioral:  Negative for confusion.         Objective          Vitals 24HR  There were no vitals filed for this visit.         Physical Exam  Constitutional:       General: She is not in acute distress.  Cardiovascular:      Rate and Rhythm: Regular rhythm.   Pulmonary:      Effort: No respiratory distress.      Breath sounds: Normal breath sounds.   Abdominal:       Palpations: Abdomen is soft.      Tenderness: There is no abdominal tenderness.   Musculoskeletal:         General: Swelling (2+ pitting edema b/l legs, wearing compression stockings) present.      Comments: Fingers and knuckles with deviated MCP, nodules suggestive of RA   Neurological:      Mental Status: She is oriented to person, place, and time.   Psychiatric:         Mood and Affect: Mood normal.           Current Outpatient Medications:     cyanocobalamin (Vitamin B-12) 1,000 mcg/mL injection, Inject 1 mL (1,000 mcg) into the muscle every 30 (thirty) days., Disp: , Rfl:     furosemide (Lasix) 20 mg tablet, Take 1 tablet (20 mg) by mouth once daily., Disp: 90 tablet, Rfl: 3    furosemide (Lasix) 20 mg tablet, TAKE 1 TABLET BY MOUTH DAILY AS NEEDED FOR WORSENING LEG SWELLING (Patient not taking: Reported on 2/2/2024), Disp: 90 tablet, Rfl: 3    hydroxychloroquine (Plaquenil) 200 mg tablet, Take 1 tablet (200 mg) by mouth once daily., Disp: 90 tablet, Rfl: 1    levothyroxine (Synthroid, Levoxyl) 25 mcg tablet, Take 1 tablet (25 mcg) by mouth once daily in the morning. Take before meals., Disp: 90 tablet, Rfl: 2    losartan (Cozaar) 25 mg tablet, Take 1 tablet (25 mg) by mouth once daily., Disp: 90 tablet, Rfl: 3    mirtazapine (Remeron) 7.5 mg tablet, Take 1 tablet (7.5 mg) by mouth once daily at bedtime., Disp: 90 tablet, Rfl: 3    multivitamin tablet, Take 1 tablet by mouth once daily., Disp: , Rfl:     multivitamin with iron tablet, Take 1 tablet by mouth once daily., Disp: , Rfl:     potassium chloride CR 20 mEq ER tablet, Take 1 tablet (20 mEq) by mouth once daily., Disp: 90 tablet, Rfl: 3    predniSONE (Deltasone) 5 mg tablet, Take 1 tablet (5 mg) by mouth once daily., Disp: 30 tablet, Rfl: 0    spironolactone (Aldactone) 25 mg tablet, Take 0.5 tablets (12.5 mg) by mouth once daily., Disp: 90 tablet, Rfl: 3     Assessment/Plan   Problem List Items Addressed This Visit    None        1) Acute Kidney vs  Chronic Kidney Disease - unclear stage  - Cr was 1.5, GFR 31 in Aug 2023, later Cr 3.1, GFR 13 in Feb 2024  - K borderline high at 4.9  - hold K supplementation due to concern for possible hyperkalemia in setting of worse renal function and RAASi (ARB, potassium sparing diuretic)  - will repeat BMP  - obtain renal ultrasound to rule out hydronephrosis (previous 2019 CT showed mild R hydro)  - given her advanced age, renal replacement therapy would not be recommended if needed and would encourage a more palliative approach; discussed with patient and daughter to evaluate expectations/wishes if it turns out she has advanced kidney disease    2) HTN  - BP today 130s/60s; reports well controlled at home  - continue losartan, spironolactone, and lasix    3) Rheumatoid Arthritis  - on hydroxychloroquine and prednisone  - follows with Rheumatology    RTC 1 month    D/w Dr. Doc Montes MD, MD  Nephrology Fellow PGY 5  Contact via secure chat      I saw and evaluated the patient. I personally obtained the key and critical portions of the history and physical exam or was physically present for key and critical portions performed by the resident/fellow. I reviewed the resident/fellow's documentation and discussed the patient with the resident/fellow. I agree with the resident/fellow's medical decision making as documented in the note.      CKD that seems worst on last labs, will get new studies with UA and kidney US. She is mostly asymptomatic, no on NSAID. Asking her to stop KCL, come back in 1 month and encourage to have GOC with family, she is very weak and fragile, not a candidate  for dialysis if we reach that point       Lincoln Roach MD  Nephrology and Hypertension

## 2024-05-17 ENCOUNTER — OFFICE VISIT (OUTPATIENT)
Dept: PRIMARY CARE | Facility: CLINIC | Age: 89
End: 2024-05-17
Payer: MEDICARE

## 2024-05-17 VITALS
DIASTOLIC BLOOD PRESSURE: 80 MMHG | SYSTOLIC BLOOD PRESSURE: 156 MMHG | OXYGEN SATURATION: 97 % | HEART RATE: 82 BPM | TEMPERATURE: 97.2 F | BODY MASS INDEX: 28.81 KG/M2 | WEIGHT: 152.5 LBS

## 2024-05-17 DIAGNOSIS — D64.9 ANEMIA, UNSPECIFIED TYPE: Primary | ICD-10-CM

## 2024-05-17 DIAGNOSIS — I10 HYPERTENSION, UNSPECIFIED TYPE: ICD-10-CM

## 2024-05-17 DIAGNOSIS — N18.31 STAGE 3A CHRONIC KIDNEY DISEASE (MULTI): ICD-10-CM

## 2024-05-17 DIAGNOSIS — E78.2 MIXED HYPERLIPIDEMIA: ICD-10-CM

## 2024-05-17 DIAGNOSIS — M06.9 RHEUMATOID ARTHRITIS INVOLVING MULTIPLE SITES, UNSPECIFIED WHETHER RHEUMATOID FACTOR PRESENT (MULTI): ICD-10-CM

## 2024-05-17 PROCEDURE — 3079F DIAST BP 80-89 MM HG: CPT | Performed by: INTERNAL MEDICINE

## 2024-05-17 PROCEDURE — 80061 LIPID PANEL: CPT | Performed by: INTERNAL MEDICINE

## 2024-05-17 PROCEDURE — 99213 OFFICE O/P EST LOW 20 MIN: CPT | Performed by: INTERNAL MEDICINE

## 2024-05-17 PROCEDURE — 1159F MED LIST DOCD IN RCRD: CPT | Performed by: INTERNAL MEDICINE

## 2024-05-17 PROCEDURE — 1036F TOBACCO NON-USER: CPT | Performed by: INTERNAL MEDICINE

## 2024-05-17 PROCEDURE — 1124F ACP DISCUSS-NO DSCNMKR DOCD: CPT | Performed by: INTERNAL MEDICINE

## 2024-05-17 PROCEDURE — 3077F SYST BP >= 140 MM HG: CPT | Performed by: INTERNAL MEDICINE

## 2024-05-17 PROCEDURE — 85025 COMPLETE CBC W/AUTO DIFF WBC: CPT | Performed by: INTERNAL MEDICINE

## 2024-05-17 PROCEDURE — 1160F RVW MEDS BY RX/DR IN RCRD: CPT | Performed by: INTERNAL MEDICINE

## 2024-05-17 PROCEDURE — 1157F ADVNC CARE PLAN IN RCRD: CPT | Performed by: INTERNAL MEDICINE

## 2024-05-17 ASSESSMENT — ENCOUNTER SYMPTOMS
LOSS OF SENSATION IN FEET: 0
OCCASIONAL FEELINGS OF UNSTEADINESS: 0
DEPRESSION: 0

## 2024-05-17 ASSESSMENT — PATIENT HEALTH QUESTIONNAIRE - PHQ9
SUM OF ALL RESPONSES TO PHQ9 QUESTIONS 1 AND 2: 0
2. FEELING DOWN, DEPRESSED OR HOPELESS: NOT AT ALL
1. LITTLE INTEREST OR PLEASURE IN DOING THINGS: NOT AT ALL

## 2024-05-17 NOTE — PROGRESS NOTES
Subjective   Patient ID: Rita Maldonado is a 89 y.o. female who presents for Follow-up.    HPI   89 years old female comes in today to see me accompanied by her daughter.  Complaining of arthritis pain.  Diagnosed with rheumatoid arthritis under the service of Dr. Santana.  On hydroxychloroquine or Plaquenil 200 mg a day and 5 mg tablet of prednisone once a day.  Reviewed her list of medication and reconciled them.  She is taking Lasix 20 mg a day.  Levothyroxine 25 mcg a day.  Losartan 25 mg a day.  Mirtazapine 7.5 mg at bedtime.  Spironolactone 25 mg half a tablet a day.  Denies chest pain shortness of breath or dyspnea on exertion.  Her main problem is arthritic pain  Review of Systems  12 system review 12 system are negative.  Objective   /80 (BP Location: Left arm, Patient Position: Sitting, BP Cuff Size: Adult)   Pulse 82   Temp 36.2 °C (97.2 °F) (Temporal)   Wt 69.2 kg (152 lb 8 oz)   SpO2 97%   BMI 28.81 kg/m²     Physical Exam  Alert oriented in no distress.  She knows her medication.  She wrote the list of her medication herself.  Nonicteric sclera no jaundice.  Face symmetrical cranial nerves intact.  Neck supple no masses no lymph node no thyromegaly or jugular venous distention.  Lungs diminished but clear no rales wheezing or crackles.  Heart normal S1 and S2 regular rhythm.  Questionable murmur present.  Soft very silent.  Abdomen benign nontender no masses no organomegaly.  Neurological exam intact.  Assessment/Plan   I with the blood pressure check is with the pressure 121 beautiful thing you Diagnoses and all orders for this visit:  Anemia, unspecified type  -     CBC  Mixed hyperlipidemia  -     Lipid Panel  Rheumatoid arthritis involving multiple sites, unspecified whether rheumatoid factor present (Multi)  Hypertension, unspecified type  Stage 3a chronic kidney disease (Multi)

## 2024-05-20 ENCOUNTER — HOSPITAL ENCOUNTER (OUTPATIENT)
Dept: RADIOLOGY | Facility: HOSPITAL | Age: 89
Discharge: HOME | End: 2024-05-20
Payer: MEDICARE

## 2024-05-20 DIAGNOSIS — N18.32 STAGE 3B CHRONIC KIDNEY DISEASE (MULTI): ICD-10-CM

## 2024-05-20 PROCEDURE — 76770 US EXAM ABDO BACK WALL COMP: CPT

## 2024-05-20 PROCEDURE — 76770 US EXAM ABDO BACK WALL COMP: CPT | Performed by: STUDENT IN AN ORGANIZED HEALTH CARE EDUCATION/TRAINING PROGRAM

## 2024-05-21 ENCOUNTER — TELEPHONE (OUTPATIENT)
Dept: PRIMARY CARE | Facility: CLINIC | Age: 89
End: 2024-05-21
Payer: MEDICARE

## 2024-05-21 NOTE — TELEPHONE ENCOUNTER
----- Message from Pedro Diallo MD sent at 5/20/2024  8:27 AM EDT -----  Regarding: r  Lab results done last week reveals to be a good lipid panel and your complete blood count shows chronic anemia of chronic disease has been going on for a while but it has nothing to worry about keep doing what you are doing and keep taking your medication your vitamins eat well and sleep well.

## 2024-05-23 ENCOUNTER — TELEPHONE (OUTPATIENT)
Dept: PRIMARY CARE | Facility: CLINIC | Age: 89
End: 2024-05-23
Payer: MEDICARE

## 2024-05-23 DIAGNOSIS — R53.83 FATIGUE, UNSPECIFIED TYPE: ICD-10-CM

## 2024-05-23 DIAGNOSIS — I10 HYPERTENSION, UNSPECIFIED TYPE: ICD-10-CM

## 2024-05-24 RX ORDER — LEVOTHYROXINE SODIUM 25 UG/1
25 TABLET ORAL
Qty: 90 TABLET | Refills: 3 | Status: SHIPPED | OUTPATIENT
Start: 2024-05-24 | End: 2025-05-24

## 2024-05-24 RX ORDER — SPIRONOLACTONE 25 MG/1
12.5 TABLET ORAL DAILY
Qty: 90 TABLET | Refills: 3 | Status: SHIPPED | OUTPATIENT
Start: 2024-05-24

## 2024-06-05 DIAGNOSIS — I10 HYPERTENSION, UNSPECIFIED TYPE: ICD-10-CM

## 2024-06-05 DIAGNOSIS — M15.9 PRIMARY OSTEOARTHRITIS INVOLVING MULTIPLE JOINTS: ICD-10-CM

## 2024-06-05 RX ORDER — PREDNISONE 5 MG/1
5 TABLET ORAL DAILY
Qty: 30 TABLET | Refills: 0 | Status: SHIPPED | OUTPATIENT
Start: 2024-06-05 | End: 2024-07-05

## 2024-06-05 RX ORDER — LOSARTAN POTASSIUM 25 MG/1
25 TABLET ORAL DAILY
Qty: 90 TABLET | Refills: 3 | Status: SHIPPED | OUTPATIENT
Start: 2024-06-05

## 2024-06-10 ENCOUNTER — APPOINTMENT (OUTPATIENT)
Dept: NEPHROLOGY | Facility: CLINIC | Age: 89
End: 2024-06-10
Payer: MEDICARE

## 2024-06-26 ENCOUNTER — APPOINTMENT (OUTPATIENT)
Dept: NEPHROLOGY | Facility: CLINIC | Age: 89
End: 2024-06-26
Payer: MEDICARE

## 2024-07-09 NOTE — PROGRESS NOTES
Nephrology Outpatient Follow-up Patient Note    Chief Concern:  Follow-up for CKD    History Of Present Illness   Rita Maldonado is a 89 y.o. female with a history of  CKD stage 4    PMH: HTN, Rheumatoid arthritis  Surg Hx: hip fracture  FH: no hx of renal disease  Soc Hx: lives with some, needs assistance with ambulation     Previous Labs  - Feb 2024 - Cr 3.1, GFR 13  - Aug 2023 - Cr 1.5, GFR 31  - May 2024 : Scr 1.99, eGFR 23  - Kidney UA 5/24 OK  - urine studies not done   - taken off KCL  - needs GOC   - she is losartan  No complaints     Past Medical History  She has no past medical history on file.  Patient Active Problem List   Diagnosis    Chronic kidney disease (CKD), stage III (moderate) (Multi)    Primary hypertension    Rheumatoid arteritis (Multi)       Surgical History  She has no past surgical history on file.     Social History  She reports that she has never smoked. She has never used smokeless tobacco. She reports that she does not drink alcohol and does not use drugs.    Family History  No family history on file.     Allergies  Patient has no known allergies.    Review of Systems  A 12-point review of systems was performed and noted be negative except for that which was mentioned in the history of present illness     Last Recorded Vitals  /75 (BP Location: Right arm, Patient Position: Sitting, BP Cuff Size: Adult)   Pulse 73   Wt 70.3 kg (155 lb)   BMI 29.29 kg/m²      Physical Exam:  Constitutional:  No acute distress  Respiration:  Breathing comfortably, no stridor  Cardiovascular:  No clubbing/cyanosis/edema in hands  Eyes:  EOM intact, sclera normal  Neuro:  Alert and oriented times 3, Cranial nerves II-XII grossly intact and symmetric bilaterally. No asterixis  Head and Face:  Symmetric facial features, no masses or lesions, sinuses non-tender to palpation  Neck/Lymph:  No LAD, no thyroid masses, trachea midline, No JVD  Skin:  no visible rash or scratching marks   Psych:  Alert  and oriented with appropriate mood and affect      Medications:  Medication Documentation Review Audit       Reviewed by Pedro Diallo MD (Physician) on 05/17/24 at 1355      Medication Order Taking? Sig Documenting Provider Last Dose Status   cyanocobalamin (Vitamin B-12) 1,000 mcg/mL injection [id number] Yes Inject 1 mL (1,000 mcg) into the muscle every 30 (thirty) days. Ruben Isaacs MD Taking Active   furosemide (Lasix) 20 mg tablet [id number] Yes Take 1 tablet (20 mg) by mouth once daily. Pedro Diallo MD Taking Active   furosemide (Lasix) 20 mg tablet [id number] Yes TAKE 1 TABLET BY MOUTH DAILY AS NEEDED FOR WORSENING LEG SWELLING Pedro Diallo MD Taking Active   hydroxychloroquine (Plaquenil) 200 mg tablet [id number] Yes Take 1 tablet (200 mg) by mouth once daily. Pedro Diallo MD Taking Active   levothyroxine (Synthroid, Levoxyl) 25 mcg tablet [id number] Yes Take 1 tablet (25 mcg) by mouth once daily in the morning. Take before meals. Pedro Diallo MD Taking Active   losartan (Cozaar) 25 mg tablet [id number] Yes Take 1 tablet (25 mg) by mouth once daily. Pedro Diallo MD Taking Active   mirtazapine (Remeron) 7.5 mg tablet [id number] Yes Take 1 tablet (7.5 mg) by mouth once daily at bedtime. Pedro Diallo MD Taking Active   multivitamin tablet [id number] Yes Take 1 tablet by mouth once daily. Ruben Isaacs MD Taking Active   multivitamin with iron tablet [id number] Yes Take 1 tablet by mouth once daily. Ruben Isaacs MD Taking Active     Discontinued 05/13/24 1424   predniSONE (Deltasone) 5 mg tablet [id number] Yes Take 1 tablet (5 mg) by mouth once daily. Pedro Diallo MD Taking Active   spironolactone (Aldactone) 25 mg tablet [id number] Yes Take 0.5 tablets (12.5 mg) by mouth once daily. Pedro Diallo MD Taking Active                    Relevant Results Recent labs reviewed in the EMR.  Lab Results   Component Value Date    HGB 11.6 (L) 08/20/2021    HGB 10.9 (L) 02/04/2021    HGB 8.3 (L) 10/27/2020    MCV 84 08/20/2021     MCV 84 02/04/2021    MCV 86 10/27/2020     08/20/2021     02/04/2021     (L) 10/27/2020       Lab Results   Component Value Date    FERRITIN 97 02/07/2023    IRON 62 02/07/2023       Lab Results   Component Value Date     05/13/2024    K 4.2 05/13/2024     (H) 05/13/2024    BUN 40 (H) 05/13/2024    CREATININE 1.99 (H) 05/13/2024       Imaging:  I personally reviewed then and this is my impression:        Assessment/Plan   1. Chronic kidney disease, stage 4 (severe) (Multi)  - CKD stage 4 well compensated, told to get urine studies, on RASi. Told she will never be a dialysis if she reach that point, pt and daughter understanding and she is working in getting her living will  - Renal function panel; Future  - CBC; Future  - Vitamin D 25-Hydroxy,Total (for eval of Vitamin D levels); Future  - PTH, intact; Future  - Urinalysis with Reflex Microscopic; Future  - Albumin-Creatinine Ratio, Urine Random; Future  - Protein, Urine Random; Future  - Follow Up In Nephrology; Future    2. Essential hypertension  - controlled     - get urine studies, labs before next appt in 5 months   Lincoln Roach MD  Nephrology and Hypertension

## 2024-07-10 ENCOUNTER — APPOINTMENT (OUTPATIENT)
Dept: NEPHROLOGY | Facility: CLINIC | Age: 89
End: 2024-07-10
Payer: MEDICARE

## 2024-07-10 VITALS
DIASTOLIC BLOOD PRESSURE: 75 MMHG | SYSTOLIC BLOOD PRESSURE: 144 MMHG | WEIGHT: 155 LBS | HEART RATE: 73 BPM | BODY MASS INDEX: 29.29 KG/M2

## 2024-07-10 DIAGNOSIS — N18.4 CHRONIC KIDNEY DISEASE, STAGE 4 (SEVERE) (MULTI): Primary | ICD-10-CM

## 2024-07-10 DIAGNOSIS — I10 ESSENTIAL HYPERTENSION: ICD-10-CM

## 2024-07-10 PROCEDURE — 1159F MED LIST DOCD IN RCRD: CPT | Performed by: INTERNAL MEDICINE

## 2024-07-10 PROCEDURE — 3077F SYST BP >= 140 MM HG: CPT | Performed by: INTERNAL MEDICINE

## 2024-07-10 PROCEDURE — 1157F ADVNC CARE PLAN IN RCRD: CPT | Performed by: INTERNAL MEDICINE

## 2024-07-10 PROCEDURE — 1036F TOBACCO NON-USER: CPT | Performed by: INTERNAL MEDICINE

## 2024-07-10 PROCEDURE — 3078F DIAST BP <80 MM HG: CPT | Performed by: INTERNAL MEDICINE

## 2024-07-10 PROCEDURE — 99213 OFFICE O/P EST LOW 20 MIN: CPT | Performed by: INTERNAL MEDICINE

## 2024-07-19 DIAGNOSIS — M05.79 RHEUMATOID ARTHRITIS INVOLVING MULTIPLE SITES WITH POSITIVE RHEUMATOID FACTOR (MULTI): Primary | ICD-10-CM

## 2024-07-19 RX ORDER — PREDNISONE 5 MG/1
5 TABLET ORAL DAILY
Qty: 21 TABLET | Refills: 17 | Status: SHIPPED | OUTPATIENT
Start: 2024-07-19 | End: 2025-07-19

## 2024-08-07 ENCOUNTER — APPOINTMENT (OUTPATIENT)
Dept: OPHTHALMOLOGY | Facility: CLINIC | Age: 89
End: 2024-08-07
Payer: MEDICARE

## 2024-08-16 ENCOUNTER — OFFICE VISIT (OUTPATIENT)
Dept: RHEUMATOLOGY | Facility: CLINIC | Age: 89
End: 2024-08-16
Payer: MEDICARE

## 2024-08-16 VITALS
WEIGHT: 152.2 LBS | DIASTOLIC BLOOD PRESSURE: 76 MMHG | SYSTOLIC BLOOD PRESSURE: 144 MMHG | BODY MASS INDEX: 29.88 KG/M2 | TEMPERATURE: 98 F | OXYGEN SATURATION: 94 % | HEART RATE: 84 BPM | HEIGHT: 60 IN

## 2024-08-16 DIAGNOSIS — M06.9 RHEUMATOID ARTHRITIS INVOLVING MULTIPLE JOINTS (MULTI): Primary | ICD-10-CM

## 2024-08-16 PROCEDURE — 1159F MED LIST DOCD IN RCRD: CPT | Performed by: INTERNAL MEDICINE

## 2024-08-16 PROCEDURE — 1036F TOBACCO NON-USER: CPT | Performed by: INTERNAL MEDICINE

## 2024-08-16 PROCEDURE — 3078F DIAST BP <80 MM HG: CPT | Performed by: INTERNAL MEDICINE

## 2024-08-16 PROCEDURE — 99214 OFFICE O/P EST MOD 30 MIN: CPT | Performed by: INTERNAL MEDICINE

## 2024-08-16 PROCEDURE — 1157F ADVNC CARE PLAN IN RCRD: CPT | Performed by: INTERNAL MEDICINE

## 2024-08-16 PROCEDURE — 3077F SYST BP >= 140 MM HG: CPT | Performed by: INTERNAL MEDICINE

## 2024-08-16 NOTE — PROGRESS NOTES
She complains of having difficulty hearing out of the left ear.  She continues to note arthritis symptoms in her hands and knees.  She has been having swelling in the lower leg without any associated shortness of breath.  She has been taking a diuretic.  She notes that the swelling tends to be less when she first gets out of bed in the morning but increases after standing.    Examination is remarkable for cerumen in the external auditory canals bilaterally obscuring the tympanic membrane bilaterally.  There is bony deformity of the DIP and PIP joints of the digits of both hands and soft tissue thickening at the MCP joint of the digits of both hands and both wrists.  There is mild bony prominence of the elbows without joint effusion.  The shoulders are not swollen.  There is soft tissue thickening over the knees, right more than left.  The lungs are clear to auscultation.  The heart has a regular rate and rhythm.  Abdomen is benign.  Extremities show pitting edema of the distal two thirds of both lower extremities, right more than left.    Laboratory (5/17/2024) WBC 4.91, hemoglobin 10.30, hematocrit 30.5, MCV 86.0, MCHC 33.8, platelets 165.1, (5/13/2024) BUN 40, creatinine 1.99, glucose 76, calcium 9.5, albumin 3.9.    DEXA bone density (2/23/2017) L3-L4 T score -1.7, right total femur T score -4.0.     She has rheumatoid arthritis with joint deformities particular involving her hands and knees. She is advanced renal insufficiency, history of hiatus hernia, pulmonary hypertension by 2D echocardiogram, vitamin B12 deficiency, hypertension, hypothyroidism, status post left hip arthroplasty. She has osteopenia not on any antiresorptive therapy.  She has bilateral cerumen impaction.    She is to try Debrox to clean out her ears.  If no improvement she is to see a physician for further management.  She is to continue hydroxychloroquine 200 mg daily and prednisone 5 mg daily.  She is to return at the next available office  appointment.

## 2024-08-29 ENCOUNTER — TELEPHONE (OUTPATIENT)
Dept: PRIMARY CARE | Facility: CLINIC | Age: 89
End: 2024-08-29
Payer: MEDICARE

## 2024-08-29 DIAGNOSIS — M06.9 RHEUMATOID ARTHRITIS INVOLVING MULTIPLE SITES, UNSPECIFIED WHETHER RHEUMATOID FACTOR PRESENT (MULTI): ICD-10-CM

## 2024-08-29 RX ORDER — HYDROXYCHLOROQUINE SULFATE 200 MG/1
200 TABLET, FILM COATED ORAL DAILY
Qty: 90 TABLET | Refills: 1 | Status: SHIPPED | OUTPATIENT
Start: 2024-08-29

## 2024-09-03 ENCOUNTER — APPOINTMENT (OUTPATIENT)
Dept: OPHTHALMOLOGY | Facility: CLINIC | Age: 89
End: 2024-09-03
Payer: MEDICARE

## 2024-09-03 DIAGNOSIS — H53.40 UNSPECIFIED VISUAL FIELD DEFECTS: ICD-10-CM

## 2024-09-03 DIAGNOSIS — H53.8 BLURRED VISION: Primary | ICD-10-CM

## 2024-09-03 PROCEDURE — 99213 OFFICE O/P EST LOW 20 MIN: CPT | Performed by: OPHTHALMOLOGY

## 2024-09-03 PROCEDURE — 92134 CPTRZ OPH DX IMG PST SGM RTA: CPT | Performed by: OPHTHALMOLOGY

## 2024-09-03 ASSESSMENT — TONOMETRY
OD_IOP_MMHG: 13
OS_IOP_MMHG: 14
IOP_METHOD: TONOPEN

## 2024-09-03 ASSESSMENT — ENCOUNTER SYMPTOMS
PSYCHIATRIC NEGATIVE: 0
ALLERGIC/IMMUNOLOGIC NEGATIVE: 0
GASTROINTESTINAL NEGATIVE: 0
RESPIRATORY NEGATIVE: 0
EYES NEGATIVE: 0
HEMATOLOGIC/LYMPHATIC NEGATIVE: 0
ENDOCRINE NEGATIVE: 0
MUSCULOSKELETAL NEGATIVE: 0
NEUROLOGICAL NEGATIVE: 0
CARDIOVASCULAR NEGATIVE: 0
CONSTITUTIONAL NEGATIVE: 0

## 2024-09-03 ASSESSMENT — CONF VISUAL FIELD
OS_NORMAL: 1
OD_INFERIOR_NASAL_RESTRICTION: 0
OD_NORMAL: 1
OD_SUPERIOR_TEMPORAL_RESTRICTION: 0
OS_INFERIOR_NASAL_RESTRICTION: 0
OS_INFERIOR_TEMPORAL_RESTRICTION: 0
OD_INFERIOR_TEMPORAL_RESTRICTION: 0
OS_SUPERIOR_NASAL_RESTRICTION: 0
OS_SUPERIOR_TEMPORAL_RESTRICTION: 0
OD_SUPERIOR_NASAL_RESTRICTION: 0
METHOD: COUNTING FINGERS

## 2024-09-03 ASSESSMENT — REFRACTION_WEARINGRX
OS_AXIS: 160
OS_CYLINDER: +1.25
OD_SPHERE: -0.25
OD_ADD: +2.50
OS_ADD: +2.50
OD_AXIS: 015
OS_SPHERE: -0.75
OD_CYLINDER: +1.25

## 2024-09-03 ASSESSMENT — VISUAL ACUITY
METHOD: SNELLEN - LINEAR
OD_CC: 20/40
OS_CC: 20/30

## 2024-09-03 ASSESSMENT — EXTERNAL EXAM - LEFT EYE: OS_EXAM: NORMAL

## 2024-09-03 ASSESSMENT — CUP TO DISC RATIO
OD_RATIO: .3
OS_RATIO: .3

## 2024-09-03 ASSESSMENT — REFRACTION_MANIFEST
OS_ADD: +2.75
OD_ADD: +2.75
OD_SPHERE: -0.50
OS_AXIS: 160
OS_SPHERE: -0.50
OD_AXIS: 015
OD_CYLINDER: +1.50
OS_CYLINDER: +1.25

## 2024-09-03 ASSESSMENT — EXTERNAL EXAM - RIGHT EYE: OD_EXAM: NORMAL

## 2024-09-03 ASSESSMENT — SLIT LAMP EXAM - LIDS
COMMENTS: GOOD POSITION, 1+ MGD
COMMENTS: GOOD POSITION, 1+ MGD

## 2024-09-03 NOTE — PROGRESS NOTES
9/3/2024 CC: 89 y.o. presents for refraction check up, annual FU (plaquenil).    Past ocular history: Pseudophakia OU, glasses  Family history: none  Past medical history: HTN, CKD  Social history: denies tobacco     Eye medications:   Both eyes: none  Hydroxychloroquine Sulfate 200 MG Oral Tablet        Allergy:  NKDA    Testing:      HVF 10-2 8/2/2023: OD-FL, gen depression, MD -5.0. OS FL, full, MD 0.1 dB    OCTm 9/3/2024: Regular macular contour, /588 um. Stable    Assessment:   Refractive error:  -Astigmatism  -MRx    ERM OS    HCQ- long term use  - OCTm: wnl  - HVF 10-2: wnl    Pseudophakia OU    GRISELDA  - OD>OS      Plan:   I explained my findings. Mrx.    Eye medications:   Both eyes: Start PFATs    Return in annual FU

## 2024-09-06 ENCOUNTER — APPOINTMENT (OUTPATIENT)
Dept: PRIMARY CARE | Facility: CLINIC | Age: 89
End: 2024-09-06
Payer: MEDICARE

## 2024-09-17 ENCOUNTER — APPOINTMENT (OUTPATIENT)
Dept: PRIMARY CARE | Facility: CLINIC | Age: 89
End: 2024-09-17
Payer: MEDICARE

## 2024-09-17 VITALS
TEMPERATURE: 97.2 F | HEART RATE: 80 BPM | WEIGHT: 153 LBS | BODY MASS INDEX: 29.88 KG/M2 | DIASTOLIC BLOOD PRESSURE: 78 MMHG | SYSTOLIC BLOOD PRESSURE: 163 MMHG | OXYGEN SATURATION: 96 %

## 2024-09-17 DIAGNOSIS — I10 HYPERTENSION, UNSPECIFIED TYPE: Primary | ICD-10-CM

## 2024-09-17 DIAGNOSIS — E03.9 HYPOTHYROIDISM, UNSPECIFIED TYPE: ICD-10-CM

## 2024-09-17 DIAGNOSIS — Z23 NEEDS FLU SHOT: ICD-10-CM

## 2024-09-17 LAB
ANION GAP SERPL CALC-SCNC: 15 MMOL/L (ref 10–20)
BUN SERPL-MCNC: 35 MG/DL (ref 7–18)
CALCIUM SERPL-MCNC: 8.8 MG/DL (ref 8.5–10.1)
CHLORIDE SERPL-SCNC: 106 MMOL/L (ref 98–107)
CO2 SERPL-SCNC: 27 MMOL/L (ref 21–32)
CREAT SERPL-MCNC: 1.88 MG/DL (ref 0.6–1.1)
EGFRCR SERPLBLD CKD-EPI 2021: 24 ML/MIN/1.73M*2
GLUCOSE SERPL-MCNC: 84 MG/DL (ref 74–100)
POTASSIUM SERPL-SCNC: 3.9 MMOL/L (ref 3.5–5.1)
SODIUM SERPL-SCNC: 144 MMOL/L (ref 136–145)
TSH SERPL-ACNC: 2.12 MIU/L (ref 0.44–3.98)

## 2024-09-17 PROCEDURE — 3078F DIAST BP <80 MM HG: CPT | Performed by: INTERNAL MEDICINE

## 2024-09-17 PROCEDURE — 3077F SYST BP >= 140 MM HG: CPT | Performed by: INTERNAL MEDICINE

## 2024-09-17 PROCEDURE — 1160F RVW MEDS BY RX/DR IN RCRD: CPT | Performed by: INTERNAL MEDICINE

## 2024-09-17 PROCEDURE — 1159F MED LIST DOCD IN RCRD: CPT | Performed by: INTERNAL MEDICINE

## 2024-09-17 PROCEDURE — 1036F TOBACCO NON-USER: CPT | Performed by: INTERNAL MEDICINE

## 2024-09-17 PROCEDURE — 84443 ASSAY THYROID STIM HORMONE: CPT | Performed by: INTERNAL MEDICINE

## 2024-09-17 PROCEDURE — 90662 IIV NO PRSV INCREASED AG IM: CPT | Performed by: INTERNAL MEDICINE

## 2024-09-17 PROCEDURE — 80048 BASIC METABOLIC PNL TOTAL CA: CPT | Performed by: INTERNAL MEDICINE

## 2024-09-17 PROCEDURE — 1157F ADVNC CARE PLAN IN RCRD: CPT | Performed by: INTERNAL MEDICINE

## 2024-09-17 PROCEDURE — G0008 ADMIN INFLUENZA VIRUS VAC: HCPCS | Performed by: INTERNAL MEDICINE

## 2024-09-17 PROCEDURE — 99214 OFFICE O/P EST MOD 30 MIN: CPT | Performed by: INTERNAL MEDICINE

## 2024-09-17 ASSESSMENT — PATIENT HEALTH QUESTIONNAIRE - PHQ9
1. LITTLE INTEREST OR PLEASURE IN DOING THINGS: NOT AT ALL
SUM OF ALL RESPONSES TO PHQ9 QUESTIONS 1 AND 2: 0
2. FEELING DOWN, DEPRESSED OR HOPELESS: NOT AT ALL

## 2024-09-17 ASSESSMENT — ENCOUNTER SYMPTOMS
DEPRESSION: 0
LOSS OF SENSATION IN FEET: 0
OCCASIONAL FEELINGS OF UNSTEADINESS: 0

## 2024-09-17 NOTE — PROGRESS NOTES
Subjective   Patient ID: Rita Maldonado is a 89 y.o. female who presents for Follow-up.    HPI   89 years old female comes in accompanied by her daughter complaining of swelling in her lower extremities.  Swelling is visible below the knee to the foot on both sides.  The daughter confirmed that she sits all day in the dining room and do not like her feet elevated.  Agreed to adjust her medication and increase her Lasix to 40 mg a day for 7 days and her prednisone because of her aches and pain from the arthritic joint to 10 mg a day for 7 days.  The rest of medications are the same.  Review of systems   12 system review 12 system are negative except for edema in the lower extremities and arthritic pain in the joints.  Objective   /78 (BP Location: Left arm, Patient Position: Sitting, BP Cuff Size: Adult)   Pulse 80   Temp 36.2 °C (97.2 °F) (Temporal)   Wt 69.4 kg (153 lb)   SpO2 96%   BMI 29.88 kg/m²     Physical Exam  Alert oriented in no distress.  Nonicteric sclera or jaundice.  Face symmetrical cranial nerves intact.  Neck supple no masses no lymph node no thyromegaly.  Lungs clear no rales wheezing or crackles.  Heart normal S1 and S2 regular rhythm.  Abdomen benign neurologically intact.  Lower extremity edema 2-3+ below the knee to the feet.  Increase Lasix to 40 mg a day for 7 days and prednisone to 10 mg for 7 days to help her arthritic pain.  See her and few months.  Assessment/Plan   Diagnoses and all orders for this visit:  Hypertension, unspecified type  -     Basic Metabolic Panel  Hypothyroidism, unspecified type  -     Thyroid Stimulating Hormone  Needs flu shot  -     Flu vaccine, trivalent, preservative free, HIGH-DOSE, age 65y+ (Fluzone)

## 2024-10-11 ENCOUNTER — TELEPHONE (OUTPATIENT)
Dept: PRIMARY CARE | Facility: CLINIC | Age: 89
End: 2024-10-11
Payer: MEDICARE

## 2024-10-11 DIAGNOSIS — M05.79 RHEUMATOID ARTHRITIS INVOLVING MULTIPLE SITES WITH POSITIVE RHEUMATOID FACTOR (MULTI): ICD-10-CM

## 2024-10-11 RX ORDER — PREDNISONE 5 MG/1
5 TABLET ORAL DAILY
Qty: 21 TABLET | Refills: 17 | Status: SHIPPED | OUTPATIENT
Start: 2024-10-11 | End: 2025-10-11

## 2024-10-15 ENCOUNTER — TELEPHONE (OUTPATIENT)
Dept: PRIMARY CARE | Facility: CLINIC | Age: 89
End: 2024-10-15
Payer: MEDICARE

## 2024-10-15 DIAGNOSIS — F32.A DEPRESSION, UNSPECIFIED DEPRESSION TYPE: ICD-10-CM

## 2024-10-15 RX ORDER — MIRTAZAPINE 7.5 MG/1
7.5 TABLET, FILM COATED ORAL NIGHTLY
Qty: 90 TABLET | Refills: 3 | Status: SHIPPED | OUTPATIENT
Start: 2024-10-15 | End: 2025-10-15

## 2024-11-07 DIAGNOSIS — M06.9 RHEUMATOID ARTHRITIS INVOLVING MULTIPLE SITES, UNSPECIFIED WHETHER RHEUMATOID FACTOR PRESENT (MULTI): ICD-10-CM

## 2024-11-07 RX ORDER — FUROSEMIDE 20 MG/1
20 TABLET ORAL DAILY
Qty: 90 TABLET | Refills: 3 | Status: SHIPPED | OUTPATIENT
Start: 2024-11-07 | End: 2025-11-07

## 2024-11-11 ENCOUNTER — TELEPHONE (OUTPATIENT)
Dept: PRIMARY CARE | Facility: CLINIC | Age: 89
End: 2024-11-11
Payer: MEDICARE

## 2024-11-11 DIAGNOSIS — M06.9 RHEUMATOID ARTHRITIS INVOLVING MULTIPLE SITES, UNSPECIFIED WHETHER RHEUMATOID FACTOR PRESENT (MULTI): ICD-10-CM

## 2024-11-11 DIAGNOSIS — I10 HYPERTENSION, UNSPECIFIED TYPE: ICD-10-CM

## 2024-11-11 RX ORDER — FUROSEMIDE 20 MG/1
TABLET ORAL
Qty: 90 TABLET | Refills: 3 | Status: SHIPPED | OUTPATIENT
Start: 2024-11-11

## 2024-11-11 RX ORDER — FUROSEMIDE 20 MG/1
20 TABLET ORAL DAILY
Qty: 90 TABLET | Refills: 3 | Status: SHIPPED | OUTPATIENT
Start: 2024-11-11 | End: 2025-11-11

## 2024-12-11 ENCOUNTER — APPOINTMENT (OUTPATIENT)
Dept: NEPHROLOGY | Facility: CLINIC | Age: 89
End: 2024-12-11
Payer: MEDICARE

## 2024-12-16 ENCOUNTER — APPOINTMENT (OUTPATIENT)
Dept: PRIMARY CARE | Facility: CLINIC | Age: 89
End: 2024-12-16
Payer: MEDICARE

## 2024-12-16 VITALS
HEART RATE: 86 BPM | DIASTOLIC BLOOD PRESSURE: 84 MMHG | BODY MASS INDEX: 30.47 KG/M2 | OXYGEN SATURATION: 94 % | SYSTOLIC BLOOD PRESSURE: 154 MMHG | TEMPERATURE: 97.5 F | WEIGHT: 156 LBS

## 2024-12-16 DIAGNOSIS — Z00.00 ROUTINE GENERAL MEDICAL EXAMINATION AT HEALTH CARE FACILITY: ICD-10-CM

## 2024-12-16 DIAGNOSIS — M05.79 RHEUMATOID ARTHRITIS INVOLVING MULTIPLE SITES WITH POSITIVE RHEUMATOID FACTOR (MULTI): ICD-10-CM

## 2024-12-16 DIAGNOSIS — R60.0 EDEMA OF BOTH LOWER EXTREMITIES: ICD-10-CM

## 2024-12-16 DIAGNOSIS — I25.10 CORONARY ARTERY DISEASE DUE TO LIPID RICH PLAQUE: ICD-10-CM

## 2024-12-16 DIAGNOSIS — I25.83 CORONARY ARTERY DISEASE DUE TO LIPID RICH PLAQUE: ICD-10-CM

## 2024-12-16 DIAGNOSIS — E78.2 MIXED HYPERLIPIDEMIA: Primary | ICD-10-CM

## 2024-12-16 DIAGNOSIS — N18.4 STAGE 4 CHRONIC KIDNEY DISEASE (MULTI): ICD-10-CM

## 2024-12-16 DIAGNOSIS — M15.0 PRIMARY OSTEOARTHRITIS INVOLVING MULTIPLE JOINTS: ICD-10-CM

## 2024-12-16 LAB
ALBUMIN SERPL BCP-MCNC: 3.6 G/DL (ref 3.4–5)
ALP SERPL-CCNC: 113 U/L (ref 45–117)
ALT SERPL W P-5'-P-CCNC: 27 U/L (ref 16–63)
ANION GAP SERPL CALC-SCNC: 17 MMOL/L (ref 10–20)
AST SERPL W P-5'-P-CCNC: 25 U/L (ref 15–37)
BILIRUB SERPL-MCNC: 0.7 MG/DL (ref 0.2–1)
BUN SERPL-MCNC: 40 MG/DL (ref 7–18)
CALCIUM SERPL-MCNC: 9.3 MG/DL (ref 8.5–10.1)
CHLORIDE SERPL-SCNC: 106 MMOL/L (ref 98–107)
CHOLEST SERPL-MCNC: 159 MG/DL (ref 0–199)
CHOLESTEROL/HDL RATIO: 1.7 (ref 4.2–7)
CO2 SERPL-SCNC: 26 MMOL/L (ref 21–32)
CREAT SERPL-MCNC: 2.13 MG/DL (ref 0.6–1.1)
CREAT UR STRIP-MCNC: 10 MG/DL
EGFRCR SERPLBLD CKD-EPI 2021: 21 ML/MIN/1.73M*2
GLUCOSE SERPL-MCNC: 84 MG/DL (ref 74–100)
HDLC SERPL-MCNC: 94 MG/DL (ref 40–59)
IS PATIENT FASTING: ABNORMAL
LDLC SERPL DIRECT ASSAY-MCNC: 55 MG/DL (ref 0–100)
MICROALBUMIN UR TEST STR-MCNC: 10 MG/L
POTASSIUM SERPL-SCNC: 4.5 MMOL/L (ref 3.5–5.1)
PROT SERPL-MCNC: 7.7 G/DL (ref 6.4–8.2)
PROT/CREAT UR: ABNORMAL UG/MG CREAT
SODIUM SERPL-SCNC: 144 MMOL/L (ref 136–145)
TRIGL SERPL-MCNC: 38 MG/DL

## 2024-12-16 PROCEDURE — 80053 COMPREHEN METABOLIC PANEL: CPT | Performed by: INTERNAL MEDICINE

## 2024-12-16 PROCEDURE — G0439 PPPS, SUBSEQ VISIT: HCPCS | Performed by: INTERNAL MEDICINE

## 2024-12-16 PROCEDURE — 3077F SYST BP >= 140 MM HG: CPT | Performed by: INTERNAL MEDICINE

## 2024-12-16 PROCEDURE — 1036F TOBACCO NON-USER: CPT | Performed by: INTERNAL MEDICINE

## 2024-12-16 PROCEDURE — 1157F ADVNC CARE PLAN IN RCRD: CPT | Performed by: INTERNAL MEDICINE

## 2024-12-16 PROCEDURE — 80061 LIPID PANEL: CPT | Performed by: INTERNAL MEDICINE

## 2024-12-16 PROCEDURE — 99214 OFFICE O/P EST MOD 30 MIN: CPT | Performed by: INTERNAL MEDICINE

## 2024-12-16 PROCEDURE — 82570 ASSAY OF URINE CREATININE: CPT | Mod: CLIA WAIVED TEST | Performed by: INTERNAL MEDICINE

## 2024-12-16 PROCEDURE — 1160F RVW MEDS BY RX/DR IN RCRD: CPT | Performed by: INTERNAL MEDICINE

## 2024-12-16 PROCEDURE — 82043 UR ALBUMIN QUANTITATIVE: CPT | Mod: CLIA WAIVED TEST | Performed by: INTERNAL MEDICINE

## 2024-12-16 PROCEDURE — 3079F DIAST BP 80-89 MM HG: CPT | Performed by: INTERNAL MEDICINE

## 2024-12-16 PROCEDURE — 1159F MED LIST DOCD IN RCRD: CPT | Performed by: INTERNAL MEDICINE

## 2024-12-16 ASSESSMENT — PATIENT HEALTH QUESTIONNAIRE - PHQ9
2. FEELING DOWN, DEPRESSED OR HOPELESS: NOT AT ALL
1. LITTLE INTEREST OR PLEASURE IN DOING THINGS: NOT AT ALL
SUM OF ALL RESPONSES TO PHQ9 QUESTIONS 1 AND 2: 0

## 2024-12-16 ASSESSMENT — ENCOUNTER SYMPTOMS
DEPRESSION: 0
OCCASIONAL FEELINGS OF UNSTEADINESS: 0
LOSS OF SENSATION IN FEET: 0

## 2024-12-16 NOTE — PROGRESS NOTES
Subjective   Reason for Visit: Rita Maldonado is an 89 y.o. female here for a Medicare Wellness visit.          Reviewed all medications by prescribing practitioner or clinical pharmacist (such as prescriptions, OTCs, herbal therapies and supplements) and documented in the medical record.    HPI  89 years old female comes in to see me today accompanied by her daughter for her subsequent Medicare wellness exam and follow-up visit.  She has a history of hypertension and rheumatoid arthritis.  Hypothyroidism and edema in the lower extremities.  Her nephrologist is Dr. Roach.  His assessment plan as to hold her potassium 2 point avoid hyperkalemia.  And because of her advanced age renal replacement therapy would not be recommended and he would encourage a more palliative approach and both daughter and patient are expecting it and agreed to that.  Medications are Lasix 20 mg a day hydroxychloroquine 200 mg tablet a day levothyroxine 25 mcg a day losartan 25 mg a day mirtazapine 7.5 mg a day multivitamins and spironolactone 25 mg a day.  Lives in [place] with her son.  No known allergies.  Never smoked and no alcohol intake.  Works for the [employer].  Patient Care Team:  Pedro Diallo MD as PCP - General (Internal Medicine)  Pedro Diallo MD as PCP - [facility] Attributed Provider     Review of Systems  12 system review 12 system are negative except for rheumatoid arthritis changes in her fingers and hands and edema in the lower extremities 3+ pitting edema.  Objective   Vitals:  /84 (BP Location: Right arm, Patient Position: Sitting, BP Cuff Size: Adult)   Pulse 86   Temp 36.4 °C (97.5 °F) (Temporal)   Wt 70.8 kg (156 lb)   SpO2 94%   BMI 30.47 kg/m²       Physical Exam  Alert oriented no distress pleasant cooperative.  Nonicteric sclera no jaundice.  Face symmetrical cranial nerves intact.  Neck supple no masses lymph no thyromegaly or jugular venous distention.  Lungs clear no rales wheezing or crackles.   Heart normal S1 and S2 regular rhythm.  Abdomen benign nontender no masses no organomegaly or pain on palpations.  Neurological exam intact.  3+ edema pitting in both lower extremities.  I advised her to increase her Lasix to the 40 mg a day until I see her again in a month and  Advised her to consult cardiology referral done to rule out any cardiac factors in her edema.  Assessment & Plan  Mixed hyperlipidemia    Orders:    Lipid Panel    Stage 4 chronic kidney disease (Multi)    Orders:    POCT ALBUMIN RANDOM URINE DOCKED DEVICE    Comprehensive Metabolic Panel    Coronary artery disease due to lipid rich plaque    Orders:    Referral to Cardiology; Future    Routine general medical examination at health care facility    Orders:    1 Year Follow Up In Primary Care - Wellness Exam; Future    Primary osteoarthritis involving multiple joints         Rheumatoid arthritis involving multiple sites with positive rheumatoid factor (Multi)         Edema of both lower extremities

## 2024-12-17 DIAGNOSIS — M06.9 RHEUMATOID ARTHRITIS INVOLVING MULTIPLE SITES, UNSPECIFIED WHETHER RHEUMATOID FACTOR PRESENT (MULTI): ICD-10-CM

## 2024-12-17 RX ORDER — FUROSEMIDE 20 MG/1
20 TABLET ORAL
Qty: 180 TABLET | Refills: 3 | Status: SHIPPED | OUTPATIENT
Start: 2024-12-17 | End: 2025-12-17

## 2025-01-23 DIAGNOSIS — M05.79 RHEUMATOID ARTHRITIS INVOLVING MULTIPLE SITES WITH POSITIVE RHEUMATOID FACTOR (MULTI): ICD-10-CM

## 2025-01-23 RX ORDER — PREDNISONE 5 MG/1
5 TABLET ORAL DAILY
Qty: 30 TABLET | Refills: 3 | Status: SHIPPED | OUTPATIENT
Start: 2025-01-23 | End: 2026-01-23

## 2025-02-03 PROBLEM — R35.0 URINARY FREQUENCY: Status: ACTIVE | Noted: 2025-02-03

## 2025-02-03 PROBLEM — M25.559 ARTHRALGIA OF HIP: Status: ACTIVE | Noted: 2025-02-03

## 2025-02-03 PROBLEM — R59.1 LYMPHADENOPATHY: Status: ACTIVE | Noted: 2025-02-03

## 2025-02-03 PROBLEM — Z96.1 BILATERAL PSEUDOPHAKIA: Status: ACTIVE | Noted: 2025-02-03

## 2025-02-03 PROBLEM — E55.9 VITAMIN D DEFICIENCY: Status: ACTIVE | Noted: 2025-02-03

## 2025-02-03 PROBLEM — R20.0 NUMBNESS AND TINGLING IN BOTH HANDS: Status: ACTIVE | Noted: 2025-02-03

## 2025-02-03 PROBLEM — I87.2 VENOUS INSUFFICIENCY: Status: ACTIVE | Noted: 2025-02-03

## 2025-02-03 PROBLEM — K21.9 GERD (GASTROESOPHAGEAL REFLUX DISEASE): Status: ACTIVE | Noted: 2025-02-03

## 2025-02-03 PROBLEM — E53.8 VITAMIN B12 DEFICIENCY: Status: ACTIVE | Noted: 2025-02-03

## 2025-02-03 PROBLEM — H35.341: Status: ACTIVE | Noted: 2025-02-03

## 2025-02-03 PROBLEM — R60.0 EDEMA OF LOWER EXTREMITY: Status: ACTIVE | Noted: 2025-02-03

## 2025-02-03 PROBLEM — H52.4 MYOPIA WITH PRESBYOPIA OF BOTH EYES: Status: ACTIVE | Noted: 2025-02-03

## 2025-02-03 PROBLEM — H52.13 MYOPIA WITH PRESBYOPIA OF BOTH EYES: Status: ACTIVE | Noted: 2025-02-03

## 2025-02-03 PROBLEM — R20.2 NUMBNESS AND TINGLING IN BOTH HANDS: Status: ACTIVE | Noted: 2025-02-03

## 2025-02-03 PROBLEM — R20.2 HAND PARESTHESIA: Status: ACTIVE | Noted: 2025-02-03

## 2025-02-03 PROBLEM — M17.0 ARTHRITIS OF BOTH KNEES: Status: ACTIVE | Noted: 2025-02-03

## 2025-02-04 ENCOUNTER — OFFICE VISIT (OUTPATIENT)
Dept: CARDIOLOGY | Facility: CLINIC | Age: OVER 89
End: 2025-02-04
Payer: MEDICARE

## 2025-02-04 VITALS
OXYGEN SATURATION: 97 % | BODY MASS INDEX: 28.76 KG/M2 | SYSTOLIC BLOOD PRESSURE: 130 MMHG | HEIGHT: 60 IN | DIASTOLIC BLOOD PRESSURE: 70 MMHG | WEIGHT: 146.5 LBS | HEART RATE: 72 BPM

## 2025-02-04 DIAGNOSIS — I10 HYPERTENSION, UNSPECIFIED TYPE: ICD-10-CM

## 2025-02-04 DIAGNOSIS — I25.83 CORONARY ARTERY DISEASE DUE TO LIPID RICH PLAQUE: ICD-10-CM

## 2025-02-04 DIAGNOSIS — I25.10 CORONARY ARTERY DISEASE DUE TO LIPID RICH PLAQUE: ICD-10-CM

## 2025-02-04 PROCEDURE — 1126F AMNT PAIN NOTED NONE PRSNT: CPT | Performed by: INTERNAL MEDICINE

## 2025-02-04 PROCEDURE — 1160F RVW MEDS BY RX/DR IN RCRD: CPT | Performed by: INTERNAL MEDICINE

## 2025-02-04 PROCEDURE — 3078F DIAST BP <80 MM HG: CPT | Performed by: INTERNAL MEDICINE

## 2025-02-04 PROCEDURE — 93005 ELECTROCARDIOGRAM TRACING: CPT | Performed by: INTERNAL MEDICINE

## 2025-02-04 PROCEDURE — 1159F MED LIST DOCD IN RCRD: CPT | Performed by: INTERNAL MEDICINE

## 2025-02-04 PROCEDURE — 99214 OFFICE O/P EST MOD 30 MIN: CPT | Performed by: INTERNAL MEDICINE

## 2025-02-04 PROCEDURE — 3075F SYST BP GE 130 - 139MM HG: CPT | Performed by: INTERNAL MEDICINE

## 2025-02-04 PROCEDURE — 1157F ADVNC CARE PLAN IN RCRD: CPT | Performed by: INTERNAL MEDICINE

## 2025-02-04 PROCEDURE — 1036F TOBACCO NON-USER: CPT | Performed by: INTERNAL MEDICINE

## 2025-02-04 ASSESSMENT — COLUMBIA-SUICIDE SEVERITY RATING SCALE - C-SSRS
2. HAVE YOU ACTUALLY HAD ANY THOUGHTS OF KILLING YOURSELF?: NO
1. IN THE PAST MONTH, HAVE YOU WISHED YOU WERE DEAD OR WISHED YOU COULD GO TO SLEEP AND NOT WAKE UP?: NO
6. HAVE YOU EVER DONE ANYTHING, STARTED TO DO ANYTHING, OR PREPARED TO DO ANYTHING TO END YOUR LIFE?: NO

## 2025-02-04 ASSESSMENT — ENCOUNTER SYMPTOMS
DEPRESSION: 0
OCCASIONAL FEELINGS OF UNSTEADINESS: 1
LOSS OF SENSATION IN FEET: 0

## 2025-02-04 ASSESSMENT — PAIN SCALES - GENERAL: PAINLEVEL_OUTOF10: 0-NO PAIN

## 2025-02-04 NOTE — PROGRESS NOTES
Primary Care Physician: Pedro Diallo MD  Date of Visit: 02/04/2025  1:20 PM EST  Location of visit: [facility] [address]     Chief Complaint:   No chief complaint on file.       HPI / Summary:   Rita Maldonado is a 89 y.o. female presents for new cardiovascular evaluation. Pedro Diallo MD   5 kids  Here with daughter  No c/o CP.No palpitations, No syncope. No edema    Hip sx, colon ca  Lives with son.  No h/o MI  No h/o stent  No h/o CVA  RA    Specialty Problems          Cardiology Problems    Primary hypertension    Venous insufficiency        No past medical history on file.       No past surgical history on file.       Social History:   reports that she has never smoked. She has never used smokeless tobacco. She reports that she does not drink alcohol and does not use drugs.      Allergies:  No Known Allergies    Outpatient Medications:  Current Outpatient Medications   Medication Instructions    cyanocobalamin (VITAMIN B-12) 1,000 mcg, Every 30 days    furosemide (Lasix) 20 mg tablet TAKE 1 TABLET BY MOUTH DAILY AS NEEDED FOR WORSENING LEG SWELLING    furosemide (LASIX) 20 mg, oral, 2 times daily (morning and late afternoon)    hydroxychloroquine (PLAQUENIL) 200 mg, oral, Daily    levothyroxine (SYNTHROID, LEVOXYL) 25 mcg, oral, Daily before breakfast    losartan (COZAAR) 25 mg, oral, Daily    mirtazapine (REMERON) 7.5 mg, oral, Nightly    multivitamin tablet 1 tablet, Daily    multivitamin with iron tablet 1 tablet, Daily RT    predniSONE (DELTASONE) 5 mg, oral, Daily    spironolactone (ALDACTONE) 12.5 mg, oral, Daily       ROS     Physical Exam:  There were no vitals filed for this visit.  Wt Readings from Last 5 Encounters:   12/16/24 70.8 kg (156 lb)   09/17/24 69.4 kg (153 lb)   08/16/24 69 kg (152 lb 3.2 oz)   07/10/24 70.3 kg (155 lb)   05/17/24 69.2 kg (152 lb 8 oz)     There is no height or weight on file to calculate BMI.   Physical Exam   Very pleasant well-appearing female.  Difficult to  neck  "veins.  Normal carotid upstrokes.  Soft systolic murmur without gallop.  Clear lungs.  Soft abdomen.  Distal pulses were palpable there was some ankle swelling  Last Labs:  CMP:  Recent Labs     12/16/24  1408 09/17/24  1449 05/13/24  1455 12/30/21  1439 08/20/21  1235    144 144 144 144   K 4.5 3.9 4.2 3.7 3.7    106 108* 106 107   CO2 26 27 25 26 27   ANIONGAP 17 15 15 16 14   BUN 40* 35* 40* 24* 23   CREATININE 2.13* 1.88* 1.99* 1.28* 1.23*   EGFR 21* 24* 23*  --   --    GLUCOSE 84 84 76 120* 83     Recent Labs     12/16/24  1408 05/13/24  1455 08/20/21  1235 10/27/20  0714 10/26/20  1727 02/11/19  0516 02/09/19  1111 02/08/19  1720   ALBUMIN 3.6 3.9 4.0 2.9* 3.3*   < >  --  4.1   ALKPHOS 113  --  114  --   --   --   --  74   ALT 27  --  10  --   --   --   --  29   AST 25  --  18  --   --   --   --  29   BILITOT 0.7  --  0.6  --   --   --   --  0.9   LIPASE  --   --   --   --   --   --  123* 125*    < > = values in this interval not displayed.     CBC:  Recent Labs     08/20/21  1235 02/04/21  0055 10/27/20  0714 10/26/20  0619 10/25/20  0906   WBC 3.7* 4.4 5.4 5.4 6.7   HGB 11.6* 10.9* 8.3* 8.5* 9.2*   HCT 34.2* 34.8* 25.2* 26.2* 27.7*    188 131* 113* 120*   MCV 84 84 86 87 85     COAG:   Recent Labs     02/04/21  0055 10/23/20  1602 02/08/19  1720   INR 1.0 1.1 1.1     HEME/ENDO:  Recent Labs     09/17/24  1449 02/07/23  1340 08/09/22  1138 05/09/22  1151 10/27/20  0714   FERRITIN  --  97 73  --  117   IRONSAT  --  22* 25  --  16*   TSH 2.12 2.22 2.47 4.26*  --       CARDIAC: No results for input(s): \"LDH\", \"CKMB\", \"TROPHS\", \"BNP\" in the last 87893 hours.    No lab exists for component: \"CK\", \"CKMBP\"  Recent Labs     12/16/24  1408   CHOL 159   HDL 94.0*   TRIG 38       Last Cardiology Tests:  ECG:  ECG: Normal record no pathological Q waves no acute changes  Echo:  Echo Results:  No results found for this or any previous visit from the past 3650 days.       Cath:      Stress Test:  Stress " Results:  No results found for this or any previous visit from the past 365 days.         Cardiac Imaging:  OCT, Retina - OU - Both Eyes  Retinal multimodal imaging including photography was completed, and the   findings are described in the examination.        Assessment/Plan     This very pleasant 89-year-old female with CKD hypertension and dependent edema has no overt symptoms to suggest angina arrhythmia or clinical heart failure.  I see no clear indication to do any further diagnostic imaging or add any further treatment.  She can follow-up with us on an as-needed basis thank you for your referral      Orders:  Orders Placed This Encounter   Procedures    ECG 12 lead (Clinic Performed)      Followup Appts:  Future Appointments   Date Time Provider Department Center   2/14/2025  1:20 PM David Santana MD [id number] [facility]   3/17/2025  1:00 PM Pedro Diallo MD [id number] [facility]   9/10/2025 10:30 AM Kera Cote MD [id number] [facility]   12/18/2025  2:30 PM Pedro Diallo MD [id number] [facility]           ____________________________________________________________  Thompson Owusu MD    Senior Attending Physician  [facility]  [facility]

## 2025-02-07 LAB
ATRIAL RATE: 72 BPM
P AXIS: 80 DEGREES
P OFFSET: 161 MS
P ONSET: 124 MS
PR INTERVAL: 198 MS
Q ONSET: 223 MS
QRS COUNT: 12 BEATS
QRS DURATION: 100 MS
QT INTERVAL: 400 MS
QTC CALCULATION(BAZETT): 438 MS
QTC FREDERICIA: 425 MS
R AXIS: 3 DEGREES
T AXIS: 46 DEGREES
T OFFSET: 423 MS
VENTRICULAR RATE: 72 BPM

## 2025-02-14 ENCOUNTER — APPOINTMENT (OUTPATIENT)
Dept: RHEUMATOLOGY | Facility: CLINIC | Age: OVER 89
End: 2025-02-14
Payer: MEDICARE

## 2025-03-17 ENCOUNTER — APPOINTMENT (OUTPATIENT)
Dept: PRIMARY CARE | Facility: CLINIC | Age: OVER 89
End: 2025-03-17
Payer: MEDICARE

## 2025-03-17 VITALS
SYSTOLIC BLOOD PRESSURE: 138 MMHG | BODY MASS INDEX: 29.29 KG/M2 | DIASTOLIC BLOOD PRESSURE: 78 MMHG | WEIGHT: 150 LBS | TEMPERATURE: 97.3 F

## 2025-03-17 DIAGNOSIS — R31.21 ASYMPTOMATIC MICROSCOPIC HEMATURIA: ICD-10-CM

## 2025-03-17 DIAGNOSIS — I10 HYPERTENSION, UNSPECIFIED TYPE: Primary | ICD-10-CM

## 2025-03-17 DIAGNOSIS — D64.9 ANEMIA, UNSPECIFIED TYPE: ICD-10-CM

## 2025-03-17 DIAGNOSIS — N18.4 CHRONIC KIDNEY DISEASE (CKD), STAGE IV (SEVERE) (MULTI): ICD-10-CM

## 2025-03-17 DIAGNOSIS — N39.0 URINARY TRACT INFECTION WITHOUT HEMATURIA, SITE UNSPECIFIED: ICD-10-CM

## 2025-03-17 DIAGNOSIS — M06.9 RHEUMATOID ARTHRITIS INVOLVING MULTIPLE SITES, UNSPECIFIED WHETHER RHEUMATOID FACTOR PRESENT (MULTI): ICD-10-CM

## 2025-03-17 LAB
ALBUMIN SERPL BCP-MCNC: 3.9 G/DL (ref 3.4–5)
ALP SERPL-CCNC: 135 U/L (ref 45–117)
ALT SERPL W P-5'-P-CCNC: 23 U/L (ref 16–63)
ANION GAP SERPL CALC-SCNC: 16 MMOL/L (ref 10–20)
APPEARANCE UR: CLEAR
AST SERPL W P-5'-P-CCNC: 18 U/L (ref 15–37)
BASOPHILS # BLD AUTO: 0.02 X10*3/UL (ref 0.1–1.6)
BASOPHILS NFR BLD AUTO: 0.45 % (ref 0–0.3)
BILIRUB SERPL-MCNC: 0.5 MG/DL (ref 0.2–1)
BILIRUB UR QL STRIP: NEGATIVE
BUN SERPL-MCNC: 57 MG/DL (ref 7–18)
CALCIUM SERPL-MCNC: 9.2 MG/DL (ref 8.5–10.1)
CHLORIDE SERPL-SCNC: 108 MMOL/L (ref 98–107)
CO2 SERPL-SCNC: 27 MMOL/L (ref 21–32)
COLOR UR: YELLOW
CREAT SERPL-MCNC: 2.4 MG/DL (ref 0.6–1.1)
EGFRCR SERPLBLD CKD-EPI 2021: 18 ML/MIN/1.73M*2
EOSINOPHIL # BLD AUTO: 0.09 X10*3/UL (ref 0.04–0.5)
EOSINOPHIL NFR BLD AUTO: 1.7 % (ref 0.7–7)
ERYTHROCYTE [DISTWIDTH] IN BLOOD BY AUTOMATED COUNT: 14.9 % (ref 11.5–14.5)
GLUCOSE SERPL-MCNC: 83 MG/DL (ref 74–100)
GLUCOSE UR STRIP-MCNC: NEGATIVE MG/DL
HCT VFR BLD AUTO: 33.7 % (ref 36.6–46.6)
HGB BLD-MCNC: 11.51 G/DL (ref 12–15.4)
HGB UR QL STRIP: NEGATIVE
KETONES UR STRIP-MCNC: NEGATIVE MG/DL
LEUKOCYTE ESTERASE UR QL STRIP: ABNORMAL
LYMPHOCYTES # BLD AUTO: 1.25 X10*3/UL (ref 0–6)
LYMPHOCYTES NFR BLD AUTO: 23.25 % (ref 20.5–51.1)
MCH RBC QN AUTO: 28.8 PG (ref 26–32)
MCHC RBC AUTO-ENTMCNC: 34.2 G/DL (ref 31–38)
MCV RBC AUTO: 84.2 FL (ref 80–96)
MONOCYTES # BLD AUTO: 0.53 X10*3/UL (ref 1.6–24.9)
MONOCYTES NFR BLD AUTO: 9.89 % (ref 1.7–9.3)
NEUTROPHILS # BLD AUTO: 3.47 X10*3/UL (ref 1.4–6.5)
NEUTROPHILS NFR BLD AUTO: 64.71 % (ref 42.2–75.2)
NITRITE UR QL STRIP: NEGATIVE
PH UR STRIP: 5.5 [PH]
PLATELET # BLD AUTO: 167.7 X10*3/UL (ref 150–450)
PMV BLD AUTO: 8.52 FL (ref 7.8–11)
POTASSIUM SERPL-SCNC: 3.8 MMOL/L (ref 3.5–5.1)
PROT SERPL-MCNC: 6.9 G/DL (ref 6.4–8.2)
PROT UR STRIP-MCNC: NEGATIVE MG/DL
RBC # BLD AUTO: 4 X10*6/UL (ref 3.9–5.3)
SODIUM SERPL-SCNC: 147 MMOL/L (ref 136–145)
SP GR UR STRIP.AUTO: 1.01
UROBILINOGEN UR STRIP-ACNC: 0.2 E.U./DL
WBC # BLD AUTO: 5.37 X10*3/UL (ref 4.5–10.5)

## 2025-03-17 PROCEDURE — 1036F TOBACCO NON-USER: CPT | Performed by: INTERNAL MEDICINE

## 2025-03-17 PROCEDURE — 85025 COMPLETE CBC W/AUTO DIFF WBC: CPT | Performed by: INTERNAL MEDICINE

## 2025-03-17 PROCEDURE — 1125F AMNT PAIN NOTED PAIN PRSNT: CPT | Performed by: INTERNAL MEDICINE

## 2025-03-17 PROCEDURE — 99214 OFFICE O/P EST MOD 30 MIN: CPT | Performed by: INTERNAL MEDICINE

## 2025-03-17 PROCEDURE — 1160F RVW MEDS BY RX/DR IN RCRD: CPT | Performed by: INTERNAL MEDICINE

## 2025-03-17 PROCEDURE — 3075F SYST BP GE 130 - 139MM HG: CPT | Performed by: INTERNAL MEDICINE

## 2025-03-17 PROCEDURE — 80053 COMPREHEN METABOLIC PANEL: CPT | Performed by: INTERNAL MEDICINE

## 2025-03-17 PROCEDURE — 1157F ADVNC CARE PLAN IN RCRD: CPT | Performed by: INTERNAL MEDICINE

## 2025-03-17 PROCEDURE — 81003 URINALYSIS AUTO W/O SCOPE: CPT | Performed by: INTERNAL MEDICINE

## 2025-03-17 PROCEDURE — 1159F MED LIST DOCD IN RCRD: CPT | Performed by: INTERNAL MEDICINE

## 2025-03-17 PROCEDURE — 3078F DIAST BP <80 MM HG: CPT | Performed by: INTERNAL MEDICINE

## 2025-03-17 RX ORDER — HYDROXYCHLOROQUINE SULFATE 200 MG/1
200 TABLET, FILM COATED ORAL DAILY
Qty: 90 TABLET | Refills: 1 | Status: SHIPPED | OUTPATIENT
Start: 2025-03-17 | End: 2025-03-17 | Stop reason: SDUPTHER

## 2025-03-17 RX ORDER — HYDROXYCHLOROQUINE SULFATE 200 MG/1
200 TABLET, FILM COATED ORAL DAILY
Qty: 90 TABLET | Refills: 1 | Status: SHIPPED | OUTPATIENT
Start: 2025-03-17

## 2025-03-17 ASSESSMENT — ENCOUNTER SYMPTOMS
OCCASIONAL FEELINGS OF UNSTEADINESS: 1
DEPRESSION: 0
LOSS OF SENSATION IN FEET: 0

## 2025-03-17 ASSESSMENT — PAIN SCALES - GENERAL: PAINLEVEL_OUTOF10: 6

## 2025-03-17 NOTE — PROGRESS NOTES
Subjective   Patient ID: iRta Maldonado is a 89 y.o. female who presents for Hypertension and Hyperlipidemia.    HPI   89 years old female comes in to see me accompanied by her daughter.  She is doing well and has no specific complaint.  Except for arthritis in most of her joints at times.  We treated her for hypertension, hypothyroidism, edema in the lower extremities, chronic renal failure/CKD stage IV with a GFR of 24 and rheumatoid arthritis.  Son lives with her and he takes care of her and her feeding and her house etc.  Medication reviewed and reconciled.  She is taking Lasix 20 mg a day vitamin B12, hydroxychloroquine 200 mg once a day she will need refill at [facility]  [id number], levothyroxine 25 mcg a day, losartan 25 mg a day, mirtazapine 7.5 mg a day, multivitamins and prednisone 5 mg a day and finally spironolactone 25 mg a day.  No known allergies  Non-smoker and no alcohol intake.  Review of Systems  12 system review 12 system are negative.  Objective   /78 (BP Location: Right arm, Patient Position: Sitting, BP Cuff Size: Adult)   Temp 36.3 °C (97.3 °F) (Temporal)   Wt 68 kg (150 lb)   BMI 29.29 kg/m²     Physical Exam  Alert oriented no distress nonicteric sclera or jaundice.  Face symmetrical cranial nerves intact.  Neck supple no masses lymph no thyromegaly or jugular venous distention.  No carotid bruits.  Lungs clear no rales wheezing or crackles but diminished.  Heart normal S1 and S2 regular rhythm.  Abdomen benign nontender no masses no organomegaly no pain on palpations, no epigastric murmur or bruit.  Neurological exam intact moves upper and lower extremities with good equal strength.  No sensory deficit.  Skin intact.  Edema in the lower extremities not bad maybe 1+.  Assessment/Plan   Diagnoses and all orders for this visit:  Hypertension, unspecified type  -     Comprehensive Metabolic Panel  Anemia, unspecified type  -     CBC w/5 Part Differential, [facility]  Asymptomatic  microscopic hematuria  -     POCT UA (Automated) docked device  Rheumatoid arthritis involving multiple sites, unspecified whether rheumatoid factor present (Multi)  -     hydroxychloroquine (Plaquenil) 200 mg tablet; Take 1 tablet (200 mg) by mouth once daily.  Urinary tract infection without hematuria, site unspecified  -     Urine Culture  Chronic kidney disease (CKD), stage IV (severe) (Multi)  Other orders  -     POCT URINALYSIS AUTOMATED

## 2025-03-19 ENCOUNTER — TELEPHONE (OUTPATIENT)
Dept: PRIMARY CARE | Facility: CLINIC | Age: OVER 89
End: 2025-03-19
Payer: MEDICARE

## 2025-03-19 DIAGNOSIS — N30.00 ACUTE CYSTITIS WITHOUT HEMATURIA: Primary | ICD-10-CM

## 2025-03-19 RX ORDER — SULFAMETHOXAZOLE AND TRIMETHOPRIM 400; 80 MG/1; MG/1
1 TABLET ORAL 2 TIMES DAILY
Qty: 10 TABLET | Refills: 0 | Status: SHIPPED | OUTPATIENT
Start: 2025-03-19 | End: 2025-03-24

## 2025-03-19 NOTE — TELEPHONE ENCOUNTER
I called the patient's daughter Dee and informed her that lab results on her mother Rita Maldonado looks almost the same with the serious kidney decline GFR 18 and BUN 57 and urine infection positive for E. coli the prescription for 5 days.  Low-dose Bactrim to take it twice a day already at the pharmacy make sure you increase her fluid intake needs more fluid than usual.

## 2025-03-20 LAB — BACTERIA UR CULT: ABNORMAL

## 2025-05-07 DIAGNOSIS — R53.83 FATIGUE, UNSPECIFIED TYPE: ICD-10-CM

## 2025-05-07 DIAGNOSIS — I10 HYPERTENSION, UNSPECIFIED TYPE: ICD-10-CM

## 2025-05-07 RX ORDER — LEVOTHYROXINE SODIUM 25 UG/1
25 TABLET ORAL
Qty: 90 TABLET | Refills: 3 | Status: SHIPPED | OUTPATIENT
Start: 2025-05-07 | End: 2026-05-07

## 2025-05-07 RX ORDER — LOSARTAN POTASSIUM 25 MG/1
25 TABLET ORAL DAILY
Qty: 90 TABLET | Refills: 3 | Status: SHIPPED | OUTPATIENT
Start: 2025-05-07

## 2025-05-27 ENCOUNTER — TELEPHONE (OUTPATIENT)
Dept: PRIMARY CARE | Facility: CLINIC | Age: OVER 89
End: 2025-05-27
Payer: MEDICARE

## 2025-05-27 DIAGNOSIS — M05.79 RHEUMATOID ARTHRITIS INVOLVING MULTIPLE SITES WITH POSITIVE RHEUMATOID FACTOR (MULTI): ICD-10-CM

## 2025-05-27 RX ORDER — PREDNISONE 5 MG/1
5 TABLET ORAL DAILY
Qty: 30 TABLET | Refills: 3 | Status: SHIPPED | OUTPATIENT
Start: 2025-05-27 | End: 2026-05-27

## 2025-06-17 ENCOUNTER — APPOINTMENT (OUTPATIENT)
Dept: PRIMARY CARE | Facility: CLINIC | Age: OVER 89
End: 2025-06-17
Payer: MEDICARE

## 2025-06-17 VITALS
BODY MASS INDEX: 29.69 KG/M2 | DIASTOLIC BLOOD PRESSURE: 86 MMHG | SYSTOLIC BLOOD PRESSURE: 152 MMHG | WEIGHT: 152 LBS | TEMPERATURE: 97.9 F | HEART RATE: 93 BPM | OXYGEN SATURATION: 92 %

## 2025-06-17 DIAGNOSIS — I10 HYPERTENSION, UNSPECIFIED TYPE: Primary | ICD-10-CM

## 2025-06-17 DIAGNOSIS — R31.21 ASYMPTOMATIC MICROSCOPIC HEMATURIA: ICD-10-CM

## 2025-06-17 DIAGNOSIS — M06.9 RHEUMATOID ARTHRITIS INVOLVING MULTIPLE SITES, UNSPECIFIED WHETHER RHEUMATOID FACTOR PRESENT (MULTI): ICD-10-CM

## 2025-06-17 DIAGNOSIS — E78.2 MIXED HYPERLIPIDEMIA: ICD-10-CM

## 2025-06-17 LAB
ANION GAP SERPL CALC-SCNC: 15 MMOL/L (ref 10–20)
APPEARANCE UR: CLEAR
BILIRUB UR QL STRIP: NEGATIVE
BUN SERPL-MCNC: 43 MG/DL (ref 7–18)
CALCIUM SERPL-MCNC: 9.3 MG/DL (ref 8.5–10.1)
CHLORIDE SERPL-SCNC: 108 MMOL/L (ref 98–107)
CHOLEST SERPL-MCNC: 164 MG/DL (ref 0–199)
CHOLESTEROL/HDL RATIO: 1.9 (ref 4.2–7)
CO2 SERPL-SCNC: 27 MMOL/L (ref 21–32)
COLOR UR: YELLOW
CREAT SERPL-MCNC: 2.18 MG/DL (ref 0.6–1.1)
EGFRCR SERPLBLD CKD-EPI 2021: 20 ML/MIN/1.73M*2
GLUCOSE SERPL-MCNC: 84 MG/DL (ref 74–100)
GLUCOSE UR STRIP-MCNC: NEGATIVE MG/DL
HDLC SERPL-MCNC: 86 MG/DL (ref 40–59)
HGB UR QL STRIP: NEGATIVE
IS PATIENT FASTING: ABNORMAL
KETONES UR STRIP-MCNC: NEGATIVE MG/DL
LDLC SERPL DIRECT ASSAY-MCNC: 71 MG/DL (ref 0–100)
LEUKOCYTE ESTERASE UR QL STRIP: NEGATIVE
NITRITE UR QL STRIP: NEGATIVE
PH UR STRIP: 5.5 [PH]
POTASSIUM SERPL-SCNC: 4.5 MMOL/L (ref 3.5–5.1)
PROT UR STRIP-MCNC: NEGATIVE MG/DL
SODIUM SERPL-SCNC: 145 MMOL/L (ref 136–145)
SP GR UR STRIP.AUTO: 1.01
TRIGL SERPL-MCNC: 44 MG/DL
UROBILINOGEN UR STRIP-ACNC: 0.2 E.U./DL

## 2025-06-17 PROCEDURE — 1159F MED LIST DOCD IN RCRD: CPT | Performed by: INTERNAL MEDICINE

## 2025-06-17 PROCEDURE — 99214 OFFICE O/P EST MOD 30 MIN: CPT | Performed by: INTERNAL MEDICINE

## 2025-06-17 PROCEDURE — 3079F DIAST BP 80-89 MM HG: CPT | Performed by: INTERNAL MEDICINE

## 2025-06-17 PROCEDURE — 80048 BASIC METABOLIC PNL TOTAL CA: CPT | Performed by: INTERNAL MEDICINE

## 2025-06-17 PROCEDURE — 1160F RVW MEDS BY RX/DR IN RCRD: CPT | Performed by: INTERNAL MEDICINE

## 2025-06-17 PROCEDURE — 3077F SYST BP >= 140 MM HG: CPT | Performed by: INTERNAL MEDICINE

## 2025-06-17 PROCEDURE — 1036F TOBACCO NON-USER: CPT | Performed by: INTERNAL MEDICINE

## 2025-06-17 RX ORDER — FUROSEMIDE 20 MG/1
20 TABLET ORAL
Qty: 180 TABLET | Refills: 3 | Status: SHIPPED | OUTPATIENT
Start: 2025-06-17 | End: 2026-06-17

## 2025-06-17 RX ORDER — HYDROXYCHLOROQUINE SULFATE 200 MG/1
200 TABLET, FILM COATED ORAL DAILY
Qty: 90 TABLET | Refills: 1 | Status: SHIPPED | OUTPATIENT
Start: 2025-06-17

## 2025-06-17 RX ORDER — SPIRONOLACTONE 25 MG/1
12.5 TABLET ORAL DAILY
Qty: 90 TABLET | Refills: 3 | Status: SHIPPED | OUTPATIENT
Start: 2025-06-17

## 2025-06-17 ASSESSMENT — ENCOUNTER SYMPTOMS
DEPRESSION: 1
LOSS OF SENSATION IN FEET: 0
OCCASIONAL FEELINGS OF UNSTEADINESS: 0

## 2025-06-17 NOTE — PROGRESS NOTES
Subjective   Patient ID: Rita Maldonado is a 89 y.o. female who presents for Follow-up (3 mos).    HPI   89 years old female comes in to see me accompanied by her daughter complaining of edema in her lower extremities.  She takes Lasix 20 mg a day and needs a refill on furosemide spironolactone hydroxychloroquine.  Her medications reviewed and reconciled he supposed to be on furosemide 20 mg twice a day, hydroxychloroquine 200 mg once a day, levothyroxine 25 mcg once a day, losartan 25 mg a day, mirtazapine 7.5 mg at bedtime, prednisone 5 mg a day and spironolactone 25 mg half a tablet a day.  I encouraged her to use compression stocking on her lower extremities and to keep her legs elevated most of the time while she is sitting in dining room.  Review of Systems  Eats well and sleeps well.  12 system review 12 system negative other than the edema in the lower extremities.    Objective   /86 (BP Location: Left arm, Patient Position: Sitting, BP Cuff Size: Adult)   Pulse 93   Temp 36.6 °C (97.9 °F) (Temporal)   Wt 68.9 kg (152 lb)   SpO2 92%   BMI 29.69 kg/m²     Physical Exam  Alert oriented in no distress nonicteric sclera no jaundice.  Cranial nerves intact.  Neck supple no masses.  Jugular venous distention or carotid bruits.  Lungs clear no rales wheezing or crackles but diminished.  Heart normal S2 regular rhythm.  Abdomen benign benign nontender no masses no organomegaly no pain on palpations.  Epigastric or abdominal aortic area no murmur no bruits.  Neurologically intact except confused at times answers vaguely at times.  Skin intact.  Edema in the lower extremities 3+ most likely venous insufficiency.  Assessment/Plan   Diagnoses and all orders for this visit:  Hypertension, unspecified type  -     Basic Metabolic Panel  -     spironolactone (Aldactone) 25 mg tablet; Take 0.5 tablets (12.5 mg) by mouth once daily.  Mixed hyperlipidemia  -     Lipid Panel  Asymptomatic microscopic hematuria  -      POCT UA (Automated) docked device  Rheumatoid arthritis involving multiple sites, unspecified whether rheumatoid factor present (Multi)  -     hydroxychloroquine (Plaquenil) 200 mg tablet; Take 1 tablet (200 mg) by mouth once daily.  -     furosemide (Lasix) 20 mg tablet; Take 1 tablet (20 mg) by mouth 2 times daily (morning and late afternoon).

## 2025-06-19 ENCOUNTER — TELEPHONE (OUTPATIENT)
Dept: PRIMARY CARE | Facility: CLINIC | Age: OVER 89
End: 2025-06-19
Payer: MEDICARE

## 2025-06-19 NOTE — TELEPHONE ENCOUNTER
----- Message from Pedro Diallo sent at 6/18/2025  3:33 PM EDT -----  Regarding: r  Your lab results from the last visit here showed normal lipid panel.  Normal urine test showed no sugar no protein no bacteria or infection no blood.  Your renal function is down declined to 28 should be above 60.  For that reason I am going to ask you to stop taking the spironolactone ,  that water pill you take half tablet every day stop taking it.  Increase your Lasix like we told you twice a day and keep your legs elevated.  Will check your kidney function at the next visit.

## 2025-06-27 DIAGNOSIS — M05.79 RHEUMATOID ARTHRITIS INVOLVING MULTIPLE SITES WITH POSITIVE RHEUMATOID FACTOR (MULTI): ICD-10-CM

## 2025-06-27 RX ORDER — PREDNISONE 5 MG/1
5 TABLET ORAL DAILY
Qty: 30 TABLET | Refills: 3 | Status: SHIPPED | OUTPATIENT
Start: 2025-06-27 | End: 2026-06-27

## 2025-08-01 DIAGNOSIS — I10 HYPERTENSION, UNSPECIFIED TYPE: ICD-10-CM

## 2025-08-01 DIAGNOSIS — F32.A DEPRESSION, UNSPECIFIED DEPRESSION TYPE: ICD-10-CM

## 2025-08-02 DIAGNOSIS — F32.A DEPRESSION, UNSPECIFIED DEPRESSION TYPE: ICD-10-CM

## 2025-08-02 DIAGNOSIS — I10 HYPERTENSION, UNSPECIFIED TYPE: ICD-10-CM

## 2025-08-02 RX ORDER — MIRTAZAPINE 7.5 MG/1
7.5 TABLET, FILM COATED ORAL NIGHTLY
Qty: 90 TABLET | Refills: 3 | Status: SHIPPED | OUTPATIENT
Start: 2025-08-02 | End: 2026-08-02

## 2025-08-02 RX ORDER — SPIRONOLACTONE 25 MG/1
12.5 TABLET ORAL DAILY
Qty: 90 TABLET | Refills: 3 | Status: SHIPPED | OUTPATIENT
Start: 2025-08-02

## 2025-08-05 ENCOUNTER — TELEPHONE (OUTPATIENT)
Dept: PRIMARY CARE | Facility: CLINIC | Age: OVER 89
End: 2025-08-05
Payer: MEDICARE

## 2025-08-05 DIAGNOSIS — I10 HYPERTENSION, UNSPECIFIED TYPE: ICD-10-CM

## 2025-08-05 RX ORDER — SPIRONOLACTONE 25 MG/1
12.5 TABLET ORAL DAILY
Qty: 90 TABLET | Refills: 3 | Status: SHIPPED | OUTPATIENT
Start: 2025-08-05

## 2025-08-26 ENCOUNTER — APPOINTMENT (OUTPATIENT)
Dept: RHEUMATOLOGY | Facility: CLINIC | Age: OVER 89
End: 2025-08-26
Payer: MEDICARE

## 2025-08-26 VITALS
SYSTOLIC BLOOD PRESSURE: 154 MMHG | BODY MASS INDEX: 29.84 KG/M2 | HEIGHT: 60 IN | WEIGHT: 152 LBS | OXYGEN SATURATION: 96 % | TEMPERATURE: 97.8 F | HEART RATE: 73 BPM | DIASTOLIC BLOOD PRESSURE: 84 MMHG

## 2025-08-26 DIAGNOSIS — F32.A DEPRESSION, UNSPECIFIED DEPRESSION TYPE: ICD-10-CM

## 2025-08-26 DIAGNOSIS — M06.9 RHEUMATOID ARTHRITIS INVOLVING MULTIPLE JOINTS (MULTI): Primary | ICD-10-CM

## 2025-08-26 DIAGNOSIS — N28.9 RENAL INSUFFICIENCY: ICD-10-CM

## 2025-08-26 PROCEDURE — 3079F DIAST BP 80-89 MM HG: CPT | Performed by: INTERNAL MEDICINE

## 2025-08-26 PROCEDURE — 1125F AMNT PAIN NOTED PAIN PRSNT: CPT | Performed by: INTERNAL MEDICINE

## 2025-08-26 PROCEDURE — 99214 OFFICE O/P EST MOD 30 MIN: CPT | Performed by: INTERNAL MEDICINE

## 2025-08-26 PROCEDURE — 3077F SYST BP >= 140 MM HG: CPT | Performed by: INTERNAL MEDICINE

## 2025-08-26 PROCEDURE — 1159F MED LIST DOCD IN RCRD: CPT | Performed by: INTERNAL MEDICINE

## 2025-08-26 PROCEDURE — 1036F TOBACCO NON-USER: CPT | Performed by: INTERNAL MEDICINE

## 2025-08-26 RX ORDER — MIRTAZAPINE 7.5 MG/1
7.5 TABLET, FILM COATED ORAL NIGHTLY
Qty: 90 TABLET | Refills: 3 | Status: SHIPPED | OUTPATIENT
Start: 2025-08-26 | End: 2026-08-26

## 2025-08-26 ASSESSMENT — PAIN SCALES - GENERAL: PAINLEVEL_OUTOF10: 8

## 2025-09-10 ENCOUNTER — APPOINTMENT (OUTPATIENT)
Dept: OPHTHALMOLOGY | Facility: CLINIC | Age: OVER 89
End: 2025-09-10
Payer: MEDICARE

## 2025-09-23 ENCOUNTER — APPOINTMENT (OUTPATIENT)
Dept: PRIMARY CARE | Facility: CLINIC | Age: OVER 89
End: 2025-09-23
Payer: MEDICARE

## 2025-12-18 ENCOUNTER — APPOINTMENT (OUTPATIENT)
Dept: PRIMARY CARE | Facility: CLINIC | Age: OVER 89
End: 2025-12-18
Payer: MEDICARE

## 2026-03-31 ENCOUNTER — APPOINTMENT (OUTPATIENT)
Dept: RHEUMATOLOGY | Facility: CLINIC | Age: OVER 89
End: 2026-03-31
Payer: MEDICARE